# Patient Record
Sex: MALE | Race: WHITE | Employment: FULL TIME | URBAN - METROPOLITAN AREA
[De-identification: names, ages, dates, MRNs, and addresses within clinical notes are randomized per-mention and may not be internally consistent; named-entity substitution may affect disease eponyms.]

---

## 2017-09-13 PROBLEM — Z79.899 ON STATIN THERAPY: Status: ACTIVE | Noted: 2017-09-13

## 2017-09-13 PROBLEM — E66.9 OBESITY: Status: ACTIVE | Noted: 2017-09-13

## 2017-09-13 PROBLEM — J45.909 ASTHMA: Status: ACTIVE | Noted: 2017-09-13

## 2017-09-13 PROBLEM — L03.90 CELLULITIS: Status: ACTIVE | Noted: 2017-09-13

## 2017-09-13 PROBLEM — E78.5 HYPERLIPIDEMIA: Status: ACTIVE | Noted: 2017-09-13

## 2017-09-13 PROBLEM — G43.909 MIGRAINE: Status: ACTIVE | Noted: 2017-09-13

## 2017-09-13 PROBLEM — M19.90 DJD (DEGENERATIVE JOINT DISEASE): Status: ACTIVE | Noted: 2017-09-13

## 2017-09-13 PROBLEM — R73.02 GLUCOSE INTOLERANCE (IMPAIRED GLUCOSE TOLERANCE): Status: ACTIVE | Noted: 2017-09-13

## 2017-09-13 PROBLEM — K26.9 DUODENAL ULCER: Status: ACTIVE | Noted: 2017-09-13

## 2017-09-13 PROBLEM — Z00.00 ANNUAL PHYSICAL EXAM: Status: ACTIVE | Noted: 2017-09-13

## 2017-09-13 RX ORDER — SUMATRIPTAN 100 MG/1
100 TABLET, FILM COATED ORAL
COMMUNITY
End: 2018-01-02 | Stop reason: SDUPTHER

## 2017-09-21 DIAGNOSIS — I10 ESSENTIAL HYPERTENSION: Primary | ICD-10-CM

## 2017-09-21 RX ORDER — AMLODIPINE BESYLATE 5 MG/1
5 TABLET ORAL DAILY
Qty: 30 TAB | Refills: 11 | Status: SHIPPED | OUTPATIENT
Start: 2017-09-21 | End: 2018-10-09 | Stop reason: SDUPTHER

## 2017-09-21 NOTE — TELEPHONE ENCOUNTER
Requested Prescriptions     Pending Prescriptions Disp Refills    amLODIPine (NORVASC) 5 mg tablet 30 Tab 11     Sig: Take 1 Tab by mouth daily.  Indications: hypertension

## 2017-11-07 PROBLEM — I10 ESSENTIAL HYPERTENSION: Status: ACTIVE | Noted: 2017-11-07

## 2017-11-08 ENCOUNTER — OFFICE VISIT (OUTPATIENT)
Dept: INTERNAL MEDICINE CLINIC | Age: 67
End: 2017-11-08

## 2017-11-08 VITALS
TEMPERATURE: 98.3 F | BODY MASS INDEX: 35.5 KG/M2 | HEIGHT: 74 IN | WEIGHT: 276.6 LBS | DIASTOLIC BLOOD PRESSURE: 75 MMHG | HEART RATE: 61 BPM | SYSTOLIC BLOOD PRESSURE: 137 MMHG | OXYGEN SATURATION: 95 % | RESPIRATION RATE: 18 BRPM

## 2017-11-08 DIAGNOSIS — T75.89XA EFFECTS OF EXPOSURE TO EXTERNAL CAUSE, INITIAL ENCOUNTER: ICD-10-CM

## 2017-11-08 DIAGNOSIS — E78.2 MIXED HYPERLIPIDEMIA: ICD-10-CM

## 2017-11-08 DIAGNOSIS — J45.909 MILD ASTHMA WITHOUT COMPLICATION, UNSPECIFIED WHETHER PERSISTENT: ICD-10-CM

## 2017-11-08 DIAGNOSIS — I10 ESSENTIAL HYPERTENSION: Primary | ICD-10-CM

## 2017-11-08 DIAGNOSIS — E66.09 CLASS 2 OBESITY DUE TO EXCESS CALORIES WITHOUT SERIOUS COMORBIDITY WITH BODY MASS INDEX (BMI) OF 35.0 TO 35.9 IN ADULT: ICD-10-CM

## 2017-11-08 DIAGNOSIS — R73.02 GLUCOSE INTOLERANCE (IMPAIRED GLUCOSE TOLERANCE): ICD-10-CM

## 2017-11-08 DIAGNOSIS — M15.9 PRIMARY OSTEOARTHRITIS INVOLVING MULTIPLE JOINTS: ICD-10-CM

## 2017-11-08 DIAGNOSIS — Z00.00 MEDICARE ANNUAL WELLNESS VISIT, INITIAL: ICD-10-CM

## 2017-11-08 DIAGNOSIS — Z23 ENCOUNTER FOR IMMUNIZATION: ICD-10-CM

## 2017-11-08 LAB
ALBUMIN SERPL-MCNC: 4.6 G/DL (ref 3.9–5.4)
ALKALINE PHOS POC: 129 U/L (ref 38–126)
ALT SERPL-CCNC: 36 U/L (ref 9–52)
AST SERPL-CCNC: 26 U/L (ref 14–36)
BUN BLD-MCNC: 17 MG/DL (ref 9–20)
CALCIUM BLD-MCNC: 10.8 MG/DL (ref 8.4–10.2)
CHLORIDE BLD-SCNC: 105 MMOL/L (ref 98–107)
CHOLEST SERPL-MCNC: 186 MG/DL (ref 0–200)
CK (CPK) POC: 91 U/L (ref 30–135)
CO2 POC: 28 MMOL/L (ref 22–32)
CREAT BLD-MCNC: 0.9 MG/DL (ref 0.8–1.5)
EGFR (POC): 86.8
GLUCOSE POC: 98 MG/DL (ref 75–110)
HBA1C MFR BLD HPLC: 6.2 % (ref 4.5–5.7)
HDLC SERPL-MCNC: 56 MG/DL (ref 35–130)
LDL CHOLESTEROL POC: 103.4 MG/DL (ref 0–130)
POTASSIUM SERPL-SCNC: 5.6 MMOL/L (ref 3.6–5)
PROT SERPL-MCNC: 7.7 G/DL (ref 6.3–8.2)
SODIUM SERPL-SCNC: 145 MMOL/L (ref 137–145)
TCHOL/HDL RATIO (POC): 3.3 (ref 0–4)
TOTAL BILIRUBIN POC: 0.8 MG/DL (ref 0.2–1.3)
TRIGL SERPL-MCNC: 133 MG/DL (ref 0–200)
VLDLC SERPL CALC-MCNC: 26.6 MG/DL

## 2017-11-08 RX ORDER — ATORVASTATIN CALCIUM 40 MG/1
TABLET, FILM COATED ORAL DAILY
COMMUNITY
End: 2018-05-30 | Stop reason: SDUPTHER

## 2017-11-08 NOTE — PROGRESS NOTES
This is an Initial Medicare Annual Wellness Exam (AWV) (Performed 12 months after IPPE or effective date of Medicare Part B enrollment, Once in a lifetime)    I have reviewed the patient's medical history in detail and updated the computerized patient record. He presents today for his initial annual Medicare wellness examination screening questionnaire. Is also here in follow-up of his medical problems include hypertension, hyperlipidemia, prior glucose intolerance, DJD, asthma, history of migraine headaches, and obesity. He is trying to get some exercise but knows he do better there and he certainly knows he do better with diet. He is taking all his medications. He denies any chest pain shortness of breath or cardiorespiratory complaints. He denies any GI/ complaints. There are no headaches or neurologic planes. He has no arthritic complaints. There are no other complaints on complete review of systems. History     Past Medical History:   Diagnosis Date    Annual physical exam 9/13/2017    Asthma 9/13/2017    Cellulitis 9/13/2017    COPD     \"minor case of COPD\" per pt, uses Advair, followed by PCP    BEND (degenerative joint disease) 9/13/2017    Duodenal ulcer 9/13/2017    Glucose intolerance (impaired glucose tolerance) 9/13/2017    Hypercholesteremia     Hyperlipidemia 9/13/2017    Hypertension     Migraine 9/13/2017    Obesity 9/13/2017    On statin therapy 9/13/2017      Past Surgical History:   Procedure Laterality Date    HX SPLENECTOMY      age 13     Current Outpatient Prescriptions   Medication Sig Dispense Refill    mometasone-formoterol (DULERA) 200-5 mcg/actuation HFA inhaler Take 2 Puffs by inhalation two (2) times a day.  atorvastatin (LIPITOR) 40 mg tablet Take  by mouth daily.  amLODIPine (NORVASC) 5 mg tablet Take 1 Tab by mouth daily.  Indications: hypertension 30 Tab 11    SUMAtriptan (IMITREX) 100 mg tablet Take 100 mg by mouth once as needed for Migraine. No Known Allergies  History reviewed. No pertinent family history. Social History   Substance Use Topics    Smoking status: Never Smoker    Smokeless tobacco: Never Used    Alcohol use Not on file      Comment: \"very little\" Per pt     Patient Active Problem List   Diagnosis Code    Encounter for screening colonoscopy Z12.11    Asthma J45.909    DJD (degenerative joint disease) M19.90    Annual physical exam Z00.00    On statin therapy Z79.899    Glucose intolerance (impaired glucose tolerance) R73.02    Hyperlipidemia E78.5    Duodenal ulcer K26.9    Cellulitis L03.90    Obesity E66.9    Migraine G43.909    Essential hypertension I10    Medicare annual wellness visit, initial Z00.00       Depression Risk Factor Screening:     PHQ over the last two weeks 11/8/2017   Little interest or pleasure in doing things Not at all   Feeling down, depressed or hopeless Not at all   Total Score PHQ 2 0     Alcohol Risk Factor Screening: You do not drink alcohol or very rarely. Functional Ability and Level of Safety:     Hearing Loss  Hearing is good. Activities of Daily Living  The home contains: no safety equipment. Patient does total self care    Fall Risk  Fall Risk Assessment, last 12 mths 11/8/2017   Able to walk? Yes   Fall in past 12 months? No       Abuse Screen  Patient is not abused    Cognitive Screening   Evaluation of Cognitive Function:  Has your family/caregiver stated any concerns about your memory: no  Normal     ROS:    Constitutional: He denies fevers, weight loss, sweats. Eyes: No blurred or double vision. ENT: No difficulty with swallowing, taste, speech or smell. Neck: no stiffness or swelling  Respiratory: No cough wheezing or shortness of breath. Cardiovascular: Denies chest pain, palpitations, unexplained indigestion or syncope. Gastrointestinal:  No changes in bowel movements, no abdominal pain, no bloating.   Genitourinary:  He denies frequency, nocturia or stranguria. Extremities: No joint pain, stiffness or swelling. Neurological:  No numbness, tingling, burring paresthesias or loss of motor strength. No syncope, dizziness or frequent headache  Lymphatic: no adenopathy noted  Hematologic: no easy bruising or bleeding gums  Skin:  No recent rashes or mole changes. Psychiatric/Behavioral:  Negative for depression. Vitals:    11/08/17 1007   BP: 137/75   Pulse: 61   Resp: 18   Temp: 98.3 °F (36.8 °C)   TempSrc: Oral   SpO2: 95%   Weight: 276 lb 9.6 oz (125.5 kg)   Height: 6' 2\" (1.88 m)   PainSc:   0 - No pain        PHYSICAL EXAM:    General appearance - alert, well appearing, and in no distress  Mental status - alert, oriented to person, place, and time  HEENT:  Ears - bilateral TM's and external ear canals clear  Eyes - pupillary responses were normal.  Extraocular muscle function intact. Lids and conjunctiva not injected. Fundoscopic exam revealed sharp disc margins. eye movements intact  Pharynx- clear with teeth in good repair. No masses were noted  Neck - supple without thyromegaly or burit. No JVD noted  Lungs - clear to auscultation and percussion  Cardiac- normal rate, regular rhythm without murmurs. PMI not displaced. No gallop, rub or click  Abdomen - flat, soft, non-tender without palpable organomegaly or mass. No pulsatile mass was felt, and not bruit was heard. Bowel sounds were active  : Circumcised, Testes descended w/o masses  Rectal: Deferred as done earlier this year. Extremities -  no clubbing cyanosis or edema  Lymphatics - no palpable lymphadenopathy, no hepatosplenomegaly  Hematologic: no petechiae or purpura  Peripheral vascular -Femoral, Dorsalis pedis and posterior tibial pulses felt without difficulty  Skin - no rash or unusual mole change noted  Neurological - Cranial nerves II-XII grossly intact. Motor strength 5/5. DTR's 2+ and symmetric.   Station and gait normal  Back exam - full range of motion, no tenderness, palpable spasm or pain on motion  Musculoskeletal - no joint tenderness, deformity or swelling      Patient Care Team   Patient Care Team:  Princess John MD as PCP - General (Internal Medicine)    Assessment/Plan   Education and counseling provided:  Are appropriate based on today's review and evaluation    ASSESSMENT:   1. Essential hypertension    2. Glucose intolerance (impaired glucose tolerance)    3. Mixed hyperlipidemia    4. Mild asthma without complication, unspecified whether persistent    5. Primary osteoarthritis involving multiple joints    6. Class 2 obesity due to excess calories without serious comorbidity with body mass index (BMI) of 35.0 to 35.9 in adult    7. Encounter for immunization    8. Medicare annual wellness visit, initial    9. Effects of exposure to external cause, initial encounter      Impression  1. Hypertension that is well controlled continue current therapy reviewed with him  2. Prior glucose intolerance his last A1c was 5.5 and he has not had a problem with A1c for some time. He will continue with diet  3. Hyperlipidemia repeat status pending we will see what the status is today and adjust medications if necessary  4. Asthma currently has not had any flares recently I gave him samples of Dulera  5. DJD seems to be stable  6. Obesity that is his major problem we discussed diet exercise weight reduction absolute need to get that down for his long-term health  Medicare annual wellness examination screening questionnaire completed today. The results were reviewed with him his questions were answered. Lifestyle recommendations and modifications discussed and made. Pneumococcal 23 vaccine given today. He is already had the flu vaccine for this year. Labs are pending as noted will make further recommendations based on those. Follow stable we will continue same and I will recheck him again in 6 months or sooner should be a problem.     PLAN:  .  Orders Placed This Encounter    Pneumococcal Polysaccharide vaccine, 23-Valent, Adult or Immunocompromised    HEPATITIS C AB    AMB POC LIPID PROFILE    AMB POC HEMOGLOBIN A1C    AMB POC COMPREHENSIVE METABOLIC PANEL    AMB POC CK (CPK)    mometasone-formoterol (DULERA) 200-5 mcg/actuation HFA inhaler    atorvastatin (LIPITOR) 40 mg tablet         ATTENTION:   This medical record was transcribed using an electronic medical records system. Although proofread, it may and can contain electronic and spelling errors. Other human spelling and other errors may be present. Corrections may be executed at a later time. Please feel free to contact us for any clarifications as needed. Follow-up Disposition:  Return in about 6 months (around 5/8/2018).       Orin Carmona MD     Health Maintenance Due   Topic Date Due    DTaP/Tdap/Td series (1 - Tdap) 07/06/1969    FOBT Q 1 YEAR AGE 50-75  07/06/1998    ZOSTER VACCINE AGE 60>  05/06/2008    GLAUCOMA SCREENING Q2Y  07/06/2013

## 2017-11-08 NOTE — MR AVS SNAPSHOT
Visit Information Date & Time Provider Department Dept. Phone Encounter #  
 11/8/2017  9:40 AM Laurie Urban MD [facility] [id number] Follow-up Instructions Return in about 6 months (around 5/8/2018). Follow-up and Disposition History Upcoming Health Maintenance Date Due DTaP/Tdap/Td series (1 - Tdap) 7/6/1969 FOBT Q 1 YEAR AGE 50-75 7/6/1998 ZOSTER VACCINE AGE 60> 5/6/2008 GLAUCOMA SCREENING Q2Y 7/6/2013 MEDICARE YEARLY EXAM 11/9/2018 Allergies as of 11/8/2017  Review Complete On: 11/8/2017 By: Laurie Urban MD  
 No Known Allergies Current Immunizations  Never Reviewed Name Date Influenza Vaccine 10/17/2017, 10/20/2016, 10/9/2015, 11/7/2014 Pneumococcal Conjugate (PCV-13) 10/7/2015 Pneumococcal Polysaccharide (PPSV-23) 11/8/2017 Pneumococcal Vaccine (Unspecified Type) 7/31/2009 Not reviewed this visit You Were Diagnosed With   
  
 Codes Comments Essential hypertension    -  Primary ICD-10-CM: I10 
ICD-9-CM: 401.9 Glucose intolerance (impaired glucose tolerance)     ICD-10-CM: R73.02 
ICD-9-CM: 790.22 Mixed hyperlipidemia     ICD-10-CM: E78.2 ICD-9-CM: 272.2 Mild asthma without complication, unspecified whether persistent     ICD-10-CM: J45.909 ICD-9-CM: 493.90 Primary osteoarthritis involving multiple joints     ICD-10-CM: M15.0 ICD-9-CM: 715.09 Class 2 obesity due to excess calories without serious comorbidity with body mass index (BMI) of 35.0 to 35.9 in adult     ICD-10-CM: E66.09, Z68.35 ICD-9-CM: 278.00, V85.35 Encounter for immunization     ICD-10-CM: G58 ICD-9-CM: V03.89 Medicare annual wellness visit, initial     ICD-10-CM: Z00.00 ICD-9-CM: V70.0 Effects of exposure to external cause, initial encounter     ICD-10-CM: T75.89XA ICD-9-CM: 994.9 Vitals BP Pulse Temp Resp Height(growth percentile) Weight(growth percentile) 137/75 61 98.3 °F (36.8 °C) (Oral) 18 6' 2\" (1.88 m) 276 lb 9.6 oz (125.5 kg) SpO2 BMI Smoking Status 95% 35.51 kg/m2 Never Smoker BMI and BSA Data Body Mass Index Body Surface Area 35.51 kg/m 2 2.56 m 2 Your Updated Medication List  
  
   
This list is accurate as of: 11/8/17 11:08 AM.  Always use your most recent med list. amLODIPine 5 mg tablet Commonly known as:  Apple Citron Take 1 Tab by mouth daily. Indications: hypertension  
  
 atorvastatin 40 mg tablet Commonly known as:  LIPITOR Take  by mouth daily. DULERA 200-5 mcg/actuation HFA inhaler Generic drug:  mometasone-formoterol Take 2 Puffs by inhalation two (2) times a day. SUMAtriptan 100 mg tablet Commonly known as:  IMITREX Take 100 mg by mouth once as needed for Migraine. We Performed the Following ADMIN PNEUMOCOCCAL VACCINE [ [facility]] AMB POC CK (CPK) [57384 CPT(R)] AMB POC COMPREHENSIVE METABOLIC PANEL [10172 CPT(R)] AMB POC HEMOGLOBIN A1C [34733 CPT(R)] AMB POC LIPID PROFILE [64089 CPT(R)] HEPATITIS C AB [90712 CPT(R)] PNEUMOCOCCAL POLYSACCHARIDE VACCINE, 23-VALENT, ADULT OR IMMUNOSUPPRESSED PT DOSE, [40238 CPT(R)] Follow-up Instructions Return in about 6 months (around 5/8/2018). Introducing [facility]! Albina Beltrán introduces [employer] patient portal. Now you can access parts of your medical record, email your doctor's office, and request medication refills online. 1. In your internet browser, go to [email or web address] 2. Click on the First Time User? Click Here link in the Sign In box. You will see the New Member Sign Up page. 3. Enter your [employer] Access Code exactly as it appears below. You will not need to use this code after youve completed the sign-up process. If you do not sign up before the expiration date, you must request a new code. · Wanelo Access Code: [email or web address] Expires: 2/6/2018  9:37 AM 
 
4. Enter the last four digits of your Social Security Number (xxxx) and Date of Birth (mm/dd/yyyy) as indicated and click Submit. You will be taken to the next sign-up page. 5. Create a Wanelo ID. This will be your Wanelo login ID and cannot be changed, so think of one that is secure and easy to remember. 6. Create a Wanelo password. You can change your password at any time. 7. Enter your Password Reset Question and Answer. This can be used at a later time if you forget your password. 8. Enter your e-mail address. You will receive e-mail notification when new information is available in [address] 9. Click Sign Up. You can now view and download portions of your medical record. 10. Click the Download Summary menu link to download a portable copy of your medical information. If you have questions, please visit the Frequently Asked Questions section of the Wanelo website. Remember, Wanelo is NOT to be used for urgent needs. For medical emergencies, dial 911. Now available from your iPhone and Android! Please provide this summary of care documentation to your next provider. Your primary care clinician is listed as Aftab. If you have any questions after today's visit, please call [phone].

## 2017-11-08 NOTE — PROGRESS NOTES
Chief Complaint   Patient presents with    Hypertension     1. Have you been to the ER, urgent care clinic since your last visit? Hospitalized since your last visit? NO    2. Have you seen or consulted any other health care providers outside of the [address] [place] [address] since your last visit? Include any pap smears or colon screening.  NO

## 2017-11-09 LAB — HCV AB S/CO SERPL IA: <0.1 S/CO RATIO (ref 0–0.9)

## 2018-01-02 DIAGNOSIS — G43.719 INTRACTABLE CHRONIC MIGRAINE WITHOUT AURA AND WITHOUT STATUS MIGRAINOSUS: Primary | ICD-10-CM

## 2018-01-02 RX ORDER — SUMATRIPTAN 100 MG/1
TABLET, FILM COATED ORAL
Qty: 9 TAB | Refills: 4 | Status: SHIPPED | OUTPATIENT
Start: 2018-01-02 | End: 2018-05-30 | Stop reason: SDUPTHER

## 2018-01-02 NOTE — TELEPHONE ENCOUNTER
Requested Prescriptions     Pending Prescriptions Disp Refills    SUMAtriptan (IMITREX) 100 mg tablet [Pharmacy Med Name: SUMATRIPTAN SUCC 100 MG TABLET] 9 Tab 4     Sig: TAKE ONE TABLET BY MOUTH DAILY AS DIRECTED PER MD

## 2018-05-07 PROBLEM — M15.9 PRIMARY OSTEOARTHRITIS INVOLVING MULTIPLE JOINTS: Status: ACTIVE | Noted: 2018-05-07

## 2018-05-07 PROBLEM — E66.09 CLASS 2 OBESITY DUE TO EXCESS CALORIES WITHOUT SERIOUS COMORBIDITY WITH BODY MASS INDEX (BMI) OF 35.0 TO 35.9 IN ADULT: Status: ACTIVE | Noted: 2017-09-13

## 2018-05-07 PROBLEM — E78.2 MIXED HYPERLIPIDEMIA: Status: ACTIVE | Noted: 2017-09-13

## 2018-05-07 PROBLEM — J45.20 MILD INTERMITTENT ASTHMA WITHOUT COMPLICATION: Status: ACTIVE | Noted: 2018-05-07

## 2018-05-08 ENCOUNTER — OFFICE VISIT (OUTPATIENT)
Dept: INTERNAL MEDICINE CLINIC | Age: 68
End: 2018-05-08

## 2018-05-08 VITALS
WEIGHT: 284 LBS | DIASTOLIC BLOOD PRESSURE: 84 MMHG | HEART RATE: 60 BPM | OXYGEN SATURATION: 97 % | TEMPERATURE: 97.9 F | BODY MASS INDEX: 36.45 KG/M2 | HEIGHT: 74 IN | RESPIRATION RATE: 16 BRPM | SYSTOLIC BLOOD PRESSURE: 110 MMHG

## 2018-05-08 DIAGNOSIS — M15.9 PRIMARY OSTEOARTHRITIS INVOLVING MULTIPLE JOINTS: ICD-10-CM

## 2018-05-08 DIAGNOSIS — I10 ESSENTIAL HYPERTENSION: Primary | ICD-10-CM

## 2018-05-08 DIAGNOSIS — E78.2 MIXED HYPERLIPIDEMIA: ICD-10-CM

## 2018-05-08 DIAGNOSIS — J45.20 MILD INTERMITTENT ASTHMA WITHOUT COMPLICATION: ICD-10-CM

## 2018-05-08 DIAGNOSIS — E66.01 SEVERE OBESITY (BMI 35.0-39.9) WITH COMORBIDITY (HCC): ICD-10-CM

## 2018-05-08 DIAGNOSIS — R73.02 GLUCOSE INTOLERANCE (IMPAIRED GLUCOSE TOLERANCE): ICD-10-CM

## 2018-05-08 DIAGNOSIS — E66.09 CLASS 2 OBESITY DUE TO EXCESS CALORIES WITHOUT SERIOUS COMORBIDITY WITH BODY MASS INDEX (BMI) OF 35.0 TO 35.9 IN ADULT: ICD-10-CM

## 2018-05-08 LAB
ALBUMIN SERPL-MCNC: 4.5 G/DL (ref 3.9–5.4)
ALKALINE PHOS POC: 98 U/L (ref 38–126)
ALT SERPL-CCNC: 31 U/L (ref 9–52)
AST SERPL-CCNC: 25 U/L (ref 14–36)
BUN BLD-MCNC: 20 MG/DL (ref 9–20)
CALCIUM BLD-MCNC: 10.4 MG/DL (ref 8.4–10.2)
CHLORIDE BLD-SCNC: 106 MMOL/L (ref 98–107)
CHOLEST SERPL-MCNC: 175 MG/DL (ref 0–200)
CK (CPK) POC: 115 U/L (ref 30–135)
CO2 POC: 28 MMOL/L (ref 22–32)
CREAT BLD-MCNC: 0.9 MG/DL (ref 0.8–1.5)
EGFR (POC): 86.8
GLUCOSE POC: 109 MG/DL (ref 75–110)
HBA1C MFR BLD HPLC: 5.9 % (ref 4.5–5.7)
HDLC SERPL-MCNC: 60 MG/DL (ref 35–130)
LDL CHOLESTEROL POC: 96.8 MG/DL (ref 0–130)
POTASSIUM SERPL-SCNC: 5.4 MMOL/L (ref 3.6–5)
PROT SERPL-MCNC: 7.5 G/DL (ref 6.3–8.2)
SODIUM SERPL-SCNC: 146 MMOL/L (ref 137–145)
TCHOL/HDL RATIO (POC): 2.9 (ref 0–4)
TOTAL BILIRUBIN POC: 1.1 MG/DL (ref 0.2–1.3)
TRIGL SERPL-MCNC: 91 MG/DL (ref 0–200)
VLDLC SERPL CALC-MCNC: 18.2 MG/DL

## 2018-05-08 NOTE — ACP (ADVANCE CARE PLANNING)
Discussed with patient advanced care medical directives. Does not have any. Information given for patient to review.

## 2018-05-08 NOTE — PROGRESS NOTES
Chief Complaint   Patient presents with    Hypertension     6 month follow up     Obesity     1. Have you been to the ER, urgent care clinic since your last visit? Hospitalized since your last visit? No    2. Have you seen or consulted any other health care providers outside of the [address] [place] [address] since your last visit? Include any pap smears or colon screening.  No     Fasting

## 2018-05-08 NOTE — MR AVS SNAPSHOT
[facility] 
 
 
 [address] [address] [phone] Patient: Peter Combs MRN: [id number] WNF:[id number] Visit Information Date & Time Provider Department Dept. Phone Encounter #  
 5/8/2018  8:40 AM Pina Grimes MD [phone] [id number] Your Appointments 11/8/2018  8:00 AM  
FOLLOW UP 10 with MD FABRICE Leroy [facility] ([facility]) Appt Note: [address] [phone] [address] [address] Upcoming Health Maintenance Date Due DTaP/Tdap/Td series (1 - Tdap) 7/6/1969 FOBT Q 1 YEAR AGE 50-75 7/6/1998 ZOSTER VACCINE AGE 60> 5/6/2008 GLAUCOMA SCREENING Q2Y 7/6/2013 Influenza Age 5 to Adult 8/1/2018 MEDICARE YEARLY EXAM 11/9/2018 Allergies as of 5/8/2018  Review Complete On: 5/8/2018 By: Valentina Jensen Certified Medical Assistant No Known Allergies Current Immunizations  Never Reviewed Name Date Influenza Vaccine 10/17/2017, 10/20/2016, 10/9/2015, 11/7/2014 Pneumococcal Conjugate (PCV-13) 10/7/2015 Pneumococcal Polysaccharide (PPSV-23) 11/8/2017 Pneumococcal Vaccine (Unspecified Type) 7/31/2009 Not reviewed this visit Vitals BP Pulse Temp Resp Height(growth percentile) Weight(growth percentile) 110/84 (BP 1 Location: Left arm, BP Patient Position: Sitting) 60 97.9 °F (36.6 °C) (Oral) 16 6' 2\" (1.88 m) 284 lb (128.8 kg) SpO2 BMI Smoking Status 97% 36.46 kg/m2 Never Smoker Vitals History BMI and BSA Data Body Mass Index Body Surface Area  
 36.46 kg/m 2 2.59 m 2 Preferred Pharmacy Pharmacy Name Phone [facility] [address] [place] [address] Dr Muniz, [address] [facility] [phone] Your Updated Medication List  
  
   
 This list is accurate as of 5/8/18  9:17 AM.  Always use your most recent med list. amLODIPine 5 mg tablet Commonly known as:  Rio Rings Take 1 Tab by mouth daily. Indications: hypertension  
  
 atorvastatin 40 mg tablet Commonly known as:  LIPITOR Take  by mouth daily. DULERA 200-5 mcg/actuation HFA inhaler Generic drug:  mometasone-formoterol Take 2 Puffs by inhalation two (2) times a day. SUMAtriptan 100 mg tablet Commonly known as:  IMITREX  
TAKE ONE TABLET BY MOUTH DAILY AS DIRECTED PER MD  
  
  
  
  
Introducing [employer]! Felicia Moncada introduces [employer] patient portal. Now you can access parts of your medical record, email your doctor's office, and request medication refills online. 1. In your internet browser, go to [email or web address] 2. Click on the First Time User? Click Here link in the Sign In box. You will see the New Member Sign Up page. 3. Enter your Y[employer]o Access Code exactly as it appears below. You will not need to use this code after youve completed the sign-up process. If you do not sign up before the expiration date, you must request a new code. · [employer] Access Code: [email or web address] Expires: 8/6/2018  9:17 AM 
 
4. Enter the last four digits of your Social Security Number (xxxx) and Date of Birth (mm/dd/yyyy) as indicated and click Submit. You will be taken to the next sign-up page. 5. Create a Yasmo ID. This will be your Yasmo login ID and cannot be changed, so think of one that is secure and easy to remember. 6. Create a Yasmo password. You can change your password at any time. 7. Enter your Password Reset Question and Answer. This can be used at a later time if you forget your password. 8. Enter your e-mail address. You will receive e-mail notification when new information is available in [address] 9. Click Sign Up. You can now view and download portions of your medical record. 10. Click the Download Summary menu link to download a portable copy of your medical information. If you have questions, please visit the Frequently Asked Questions section of the [employer] website. Remember, [employer] is NOT to be used for urgent needs. For medical emergencies, dial 911. Now available from your iPhone and Android! Please provide this summary of care documentation to your next provider. Your primary care clinician is listed as Aftab. If you have any questions after today's visit, please call [phone].

## 2018-05-08 NOTE — PROGRESS NOTES
Chief Complaint   Patient presents with    Hypertension     6 month follow up     Obesity       SUBJECTIVE:    Luz Elena Morales is a 69 y.o. male who returns in follow-up of his medical problems include hypertension, glucose intolerance, hyperlipidemia, asthma, DJD, and obesity. He is taking his medications and trying to follow his diet and get some exercise. He did not have any problems with any asthma flare so far through the allergy season. He is still using his inhaler Dulera. He denies any chest pain, shortness breath, palpitations or cardiorespiratory complaints. He denies any GI or  complaints. He denies any headaches, dizziness or neurologic complaints. He has no current arthritic complaints and then no other complaints on complete review of systems. Current Outpatient Prescriptions   Medication Sig Dispense Refill    SUMAtriptan (IMITREX) 100 mg tablet TAKE ONE TABLET BY MOUTH DAILY AS DIRECTED PER MD 9 Tab 4    mometasone-formoterol (DULERA) 200-5 mcg/actuation HFA inhaler Take 2 Puffs by inhalation two (2) times a day.  atorvastatin (LIPITOR) 40 mg tablet Take  by mouth daily.  amLODIPine (NORVASC) 5 mg tablet Take 1 Tab by mouth daily.  Indications: hypertension 27 Tab 11     Past Medical History:   Diagnosis Date    Annual physical exam 9/13/2017    Asthma 9/13/2017    Cellulitis 9/13/2017    COPD     \"minor case of COPD\" per pt, uses Advair, followed by PCP    ALO (degenerative joint disease) 9/13/2017    Duodenal ulcer 9/13/2017    Glucose intolerance (impaired glucose tolerance) 9/13/2017    Hypercholesteremia     Hyperlipidemia 9/13/2017    Hypertension     Migraine 9/13/2017    Obesity 9/13/2017    On statin therapy 9/13/2017     Past Surgical History:   Procedure Laterality Date    HX SPLENECTOMY      age 13     No Known Allergies    REVIEW OF SYSTEMS:  General: negative for - chills or fever, or weight loss or gain  ENT: negative for - headaches, nasal congestion or tinnitus  Eyes: no blurred or visual changes  Neck: No stiffness or swollen nodes  Respiratory: negative for - cough, hemoptysis, shortness of breath or wheezing  Cardiovascular : negative for - chest pain, edema, palpitations or shortness of breath  Gastrointestinal: negative for - abdominal pain, blood in stools, heartburn or nausea/vomiting  Genito-Urinary: no dysuria, trouble voiding, or hematuria  Musculoskeletal: negative for - gait disturbance, joint pain, joint stiffness or joint swelling  Neurological: no TIA or stroke symptoms  Hematologic: no bruises, no bleeding  Lymphatic: no swollen glands  Integument: no lumps, mole changes, nail changes or rash  Endocrine:no malaise/lethargy poly uria or polydipsia or unexpected weight changes        Social History     Social History    Marital status:      Spouse name: N/A    Number of children: N/A    Years of education: N/A     Social History Main Topics    Smoking status: Never Smoker    Smokeless tobacco: Never Used    Alcohol use None      Comment: \"very little\" Per pt    Drug use: No    Sexual activity: Not Asked     Other Topics Concern    None     Social History Narrative     History reviewed. No pertinent family history. OBJECTIVE:     Visit Vitals    /84 (BP 1 Location: Left arm, BP Patient Position: Sitting)    Pulse 60    Temp 97.9 °F (36.6 °C) (Oral)    Resp 16    Ht 6' 2\" (1.88 m)    Wt 284 lb (128.8 kg)    SpO2 97%    BMI 36.46 kg/m2     CONSTITUTIONAL:   well nourished, appears age appropriate  EYES: sclera anicteric, PERRL, EOMI  ENMT:nares clear, moist mucous membranes, pharynx clear  NECK: supple.  Thyroid normal, No JVD or bruits  RESPIRATORY: Chest: clear to ascultation and percussion, normal inspiratory effort  CARDIOVASCULAR: Heart: regular rate and rhythm no murmurs, rubs or gallops, PMI not displaced, No thrills  GASTROINTESTINAL: Abdomen: non distended, soft, non tender, bowel sounds normal  HEMATOLOGIC: no purpura, petechiae or bruising  LYMPHATIC: No lymph node enlargemant  MUSCULOSKELETAL: Extremities: no edema or active synovitis, pulse 1+   INTEGUMENT: No unusual rashes or suspicious skin lesions noted. Nails appear normal.  PERIPHERAL VASCULAR: normal pulses femoral, PT and DP  NEUROLOGIC: non-focal exam, A & O X 3  PSYCHIATRIC:, appropriate affect     ASSESSMENT:   1. Essential hypertension    2. Glucose intolerance (impaired glucose tolerance)    3. Mixed hyperlipidemia    4. Primary osteoarthritis involving multiple joints    5. Mild intermittent asthma without complication    6. Class 2 obesity due to excess calories without serious comorbidity with body mass index (BMI) of 35.0 to 35.9 in adult    7. Severe obesity (BMI 35.0-39. 9) with comorbidity ([address]      Impression  1. Hypertension that is controlled continue current therapy reviewed with him  2.  Glucose intolerance repeat status pending reviewed prior labs and make adjustments if necessary  3. Hyperlipidemia prior labs reviewed repeat status pending will adjust if needed  4. DJD that is stable  5. Asthma currently stable  6. Obesity again discussed absolute importance of work on getting his weight down which is unfortunately up 8 pounds since his last visit. Discussed overall health benefit of that and ways to get his weight down with diet and exercise. I will call the lab make further recommendations adjustments if necessary. Follow-up scheduled again for 6 months. Advance care planning discussed with him and he does not have a medical directive on file and I suggested he work on getting one. Samples of Dulera given. PLAN:  .  Orders Placed This Encounter    AMB POC HEMOGLOBIN A1C    AMB POC LIPID PROFILE    AMB POC COMPREHENSIVE METABOLIC PANEL    AMB POC CK (CPK)         ATTENTION:   This medical record was transcribed using an electronic medical records system.   Although proofread, it may and can contain electronic and spelling errors. Other human spelling and other errors may be present. Corrections may be executed at a later time. Please feel free to contact us for any clarifications as needed. Follow-up Disposition:  Return in about 6 months (around 11/8/2018). No results found for any visits on 05/08/18. Pina Grimes MD    The patient verbalized understanding of the problems and plans as explained.

## 2018-05-30 DIAGNOSIS — G43.719 INTRACTABLE CHRONIC MIGRAINE WITHOUT AURA AND WITHOUT STATUS MIGRAINOSUS: ICD-10-CM

## 2018-05-30 DIAGNOSIS — E78.2 MIXED HYPERLIPIDEMIA: Primary | ICD-10-CM

## 2018-05-31 RX ORDER — SUMATRIPTAN 100 MG/1
TABLET, FILM COATED ORAL
Qty: 9 TAB | Refills: 3 | Status: SHIPPED | OUTPATIENT
Start: 2018-05-31 | End: 2018-10-23 | Stop reason: SDUPTHER

## 2018-05-31 RX ORDER — ATORVASTATIN CALCIUM 40 MG/1
TABLET, FILM COATED ORAL
Qty: 30 TAB | Refills: 11 | Status: SHIPPED | OUTPATIENT
Start: 2018-05-31 | End: 2019-04-14 | Stop reason: SDUPTHER

## 2018-05-31 NOTE — TELEPHONE ENCOUNTER
RX refill request from the patient/pharmacy. Patient last seen 05- with labs, and next appt. scheduled for 11-.

## 2018-10-09 DIAGNOSIS — I10 ESSENTIAL HYPERTENSION: ICD-10-CM

## 2018-10-09 RX ORDER — AMLODIPINE BESYLATE 5 MG/1
TABLET ORAL
Qty: 30 TAB | Refills: 11 | Status: SHIPPED | OUTPATIENT
Start: 2018-10-09 | End: 2019-10-16 | Stop reason: SDUPTHER

## 2018-10-09 NOTE — TELEPHONE ENCOUNTER
RX refill request from the patient/pharmacy.  Patient last seen 05- with labs, and next appt. scheduled for 11-    Requested Prescriptions     Pending Prescriptions Disp Refills    amLODIPine (NORVASC) 5 mg tablet [Pharmacy Med Name: amLODIPine BESYLATE 5 MG TAB] 30 Tab 11     Sig: TAKE ONE TABLET BY MOUTH DAILY

## 2018-10-23 DIAGNOSIS — G43.719 INTRACTABLE CHRONIC MIGRAINE WITHOUT AURA AND WITHOUT STATUS MIGRAINOSUS: ICD-10-CM

## 2018-10-23 RX ORDER — SUMATRIPTAN 100 MG/1
TABLET, FILM COATED ORAL
Qty: 9 TAB | Refills: 2 | Status: SHIPPED | OUTPATIENT
Start: 2018-10-23 | End: 2019-01-15 | Stop reason: SDUPTHER

## 2018-10-23 NOTE — TELEPHONE ENCOUNTER
RX refill request from the patient/pharmacy.  Patient last seen 05- with labs, and next appt. scheduled for 11-  Requested Prescriptions     Pending Prescriptions Disp Refills    SUMAtriptan (IMITREX) 100 mg tablet [Pharmacy Med Name: SUMAtriptan SUCC 100 MG TABLET] 9 Tab 2     Sig: TAKE ONE TABLET BY MOUTH DAILY AS DIRECTED PER MD   .

## 2018-11-07 PROBLEM — Z12.5 PROSTATE CANCER SCREENING: Status: ACTIVE | Noted: 2018-11-07

## 2018-11-07 NOTE — PROGRESS NOTES
This is a Subsequent Medicare Annual Wellness Visit providing Personalized Prevention Plan Services (PPPS) (Performed 12 months after initial AWV and PPPS ) I have reviewed the patient's medical history in detail and updated the computerized patient record. He presents today for subsequent annual wellness examination screening questionnaire. He is also here in follow-up of his multiple medical problems to include hypertension, glucose intolerance, hyperlipidemia, asthma, DJD, obesity, irritable bowel and other medical problems. He is taking his medications and trying to follow his diet and get some exercise but he knows he could do what better with diet and exercise. He is still working on a daily basis but plans to retire soon. He currently denies any chest pain, shortness breath, palpitations, PND, orthopnea or cardiorespiratory complaints. There are no GI/ complaints. He denies any headaches, dizziness or neurologic planes. He has no current arthritic complaints and then no other complaints on complete review of systems. History Past Medical History:  
Diagnosis Date  Annual physical exam 9/13/2017  Asthma 9/13/2017  Cellulitis 9/13/2017  COPD \"minor case of COPD\" per pt, uses Advair, followed by PCP  ALO (degenerative joint disease) 9/13/2017  Duodenal ulcer 9/13/2017  Glucose intolerance (impaired glucose tolerance) 9/13/2017  Hypercholesteremia  Hyperlipidemia 9/13/2017  Hypertension  Migraine 9/13/2017  Obesity 9/13/2017  On statin therapy 9/13/2017 Past Surgical History:  
Procedure Laterality Date  HX SPLENECTOMY    
 age 13 Social History Tobacco Use  Smoking status: Never Smoker  Smokeless tobacco: Never Used Substance Use Topics  Alcohol use: Not on file Comment: \"very little\" Per pt  Drug use: No  
 
Current Outpatient Medications Medication Sig Dispense Refill  SUMAtriptan (IMITREX) 100 mg tablet TAKE ONE TABLET BY MOUTH DAILY AS DIRECTED PER MD 9 Tab 2  
 amLODIPine (NORVASC) 5 mg tablet TAKE ONE TABLET BY MOUTH DAILY 30 Tab 11  
 atorvastatin (LIPITOR) 40 mg tablet TAKE ONE TABLET BY MOUTH DAILY 30 Tab 11  
 mometasone-formoterol (DULERA) 200-5 mcg/actuation HFA inhaler Take 2 Puffs by inhalation two (2) times a day. No Known Allergies History reviewed. No pertinent family history. Patient Active Problem List  
 Diagnosis  Primary osteoarthritis involving multiple joints  Mild intermittent asthma without complication  Essential hypertension  Glucose intolerance (impaired glucose tolerance)  Mixed hyperlipidemia  Severe obesity (BMI 35.0-39. 9) with comorbidity ([address]  Prostate cancer screening  Medicare annual wellness visit, subsequent  Annual physical exam  
 Duodenal ulcer  Cellulitis  Migraine  Encounter for screening colonoscopy Patient Care Team: 
Marita Ng MD as PCP - General (Internal Medicine) Depression Risk Factor Screening: PHQ over the last two weeks 11/8/2018 Little interest or pleasure in doing things Not at all Feeling down, depressed, irritable, or hopeless Not at all Total Score PHQ 2 0 Alcohol Risk Factor Screening: You do not drink alcohol or very rarely. Functional Ability and Level of Safety:  
 
Fall Risk Fall Risk Assessment, last 12 mths 11/8/2018 Able to walk? Yes Fall in past 12 months? No  
 
 
Hearing Loss  
mild Activities of Daily Living Self-care. ADL Assessment 5/8/2018 Feeding yourself No Help Needed Getting from bed to chair No Help Needed Getting dressed No Help Needed Bathing or showering No Help Needed Walk across the room (includes cane/walker) No Help Needed Using the telphone No Help Needed Taking your medications No Help Needed Preparing meals No Help Needed Managing money (expenses/bills) No Help Needed Moderately strenuous housework (laundry) No Help Needed Shopping for personal items (toiletries/medicines) No Help Needed Shopping for groceries No Help Needed Driving No Help Needed Climbing a flight of stairs No Help Needed Getting to places beyond walking distances No Help Needed Abuse Screen Patient is not abused Social History Social History Narrative  Not on file Review of Systems ROS: 
 
Constitutional: He denies fevers, weight loss, sweats. Eyes: No blurred or double vision. ENT: No difficulty with swallowing, taste, speech or smell. Neck: no stiffness or swelling Respiratory: No cough wheezing or shortness of breath. Cardiovascular: Denies chest pain, palpitations, unexplained indigestion or syncope. Gastrointestinal:  No changes in bowel movements, no abdominal pain, no bloating. Genitourinary:  He denies frequency, nocturia or stranguria. Extremities: No joint pain, stiffness or swelling. Neurological:  No numbness, tingling, burring paresthesias or loss of motor strength. No syncope, dizziness or frequent headache Lymphatic: no adenopathy noted Hematologic: no easy bruising or bleeding gums Skin:  No recent rashes or mole changes. Psychiatric/Behavioral:  Negative for depression. Physical Examination Evaluation of Cognitive Function: 
Mood/affect:  happy Appearance: age appropriate Family member/caregiver input: none Visit Vitals /80 (BP 1 Location: Left arm, BP Patient Position: Sitting) Pulse 68 Resp 18 Ht 6' 2\" (1.88 m) Wt 282 lb 9.6 oz (128.2 kg) SpO2 95% BMI 36.28 kg/m² Vitals:  
 11/08/18 3342 BP: 122/80 Pulse: 68 Resp: 18 SpO2: 95% Weight: 282 lb 9.6 oz (128.2 kg) Height: 6' 2\" (1.88 m) PainSc:   0 - No pain PHYSICAL EXAM: 
 
General appearance - alert, well appearing, and in no distress Mental status - alert, oriented to person, place, and time HEENT: 
 Ears - bilateral TM's and external ear canals clear Eyes - pupillary responses were normal.  Extraocular muscle function intact. Lids and conjunctiva not injected. Fundoscopic exam revealed sharp disc margins. eye movements intact Pharynx- clear with teeth in good repair. No masses were noted Neck - supple without thyromegaly or burit. No JVD noted Lungs - clear to auscultation and percussion Cardiac- normal rate, regular rhythm without murmurs. PMI not displaced. No gallop, rub or click Abdomen - flat, soft, non-tender without palpable organomegaly or mass. No pulsatile mass was felt, and not bruit was heard. Bowel sounds were active : Circumcised, Testes descended w/o masses Rectal: normal sphincter tone, prostate normal, no masses, stool brown and hemacult negative Extremities -  no clubbing cyanosis or edema Lymphatics - no palpable lymphadenopathy, no hepatosplenomegaly Hematologic: no petechiae or purpura Peripheral vascular -Femoral, Dorsalis pedis and posterior tibial pulses felt without difficulty Skin - no rash or unusual mole change noted Neurological - Cranial nerves II-XII grossly intact. Motor strength 5/5. DTR's 2+ and symmetric. Station and gait normal 
Back exam - full range of motion, no tenderness, palpable spasm or pain on motion Musculoskeletal - no joint tenderness, deformity or swelling Results for orders placed or performed in visit on 05/08/18 AMB POC HEMOGLOBIN A1C Result Value Ref Range Hemoglobin A1c (POC) 5.9 (A) 4.5 - 5.7 % AMB POC LIPID PROFILE Result Value Ref Range Cholesterol (POC) 175.0 0 - 200 mg/dL Triglycerides (POC) 91.0 0 - 200 mg/dL HDL Cholesterol (POC) 60.0 35 - 130 mg/dL VLDL (POC) 18.2 MG/DL  
 LDL Cholesterol (POC) 96.8 0.0 - 130.0 MG/DL TChol/HDL Ratio (POC) 2.9 0.0 - 4.0 AMB POC COMPREHENSIVE METABOLIC PANEL Result Value Ref Range GLUCOSE 109.0 75 - 110 mg/dL BUN 20.0 9 - 20 mg/dL Creatinine (POC) 0.9 0.8 - 1.5 mg/dL Sodium (POC) 146.0 (A) 137 - 145 MMOL/L Potassium (POC) 5.4 (A) 3.6 - 5.0 MMOL/L  
 CHLORIDE 106.0 98 - 107 MMOL/L  
 CO2 28.0 22 - 32 MMOL/L  
 CALCIUM 10.4 (A) 8.4 - 10.2 mg/dL TOTAL PROTEIN 7.5 6.3 - 8.2 g/dL ALBUMIN 4.5 3.9 - 5.4 g/dL AST (POC) 25 14 - 36 U/L  
 ALT (POC) 31 9 - 52 U/L ALKALINE PHOS 98.0 38 - 126 U/L  
 TOTAL BILIRUBIN 1.1 0.2 - 1.3 mg/dL  
 eGFR (POC) 86.8 AMB POC CK (CPK) Result Value Ref Range CK (CPK) (POC) 115.0 30 - 135 U/L Advice/Referrals/Counseling Education and counseling provided: 
Are appropriate based on today's review and evaluation End-of-Life planning (with patient's consent) Pneumococcal Vaccine Influenza Vaccine Colorectal cancer screening tests Assessment/Plan ASSESSMENT:  
1. Essential hypertension 2. Glucose intolerance (impaired glucose tolerance) 3. Mixed hyperlipidemia 4. Mild intermittent asthma without complication 5. Primary osteoarthritis involving multiple joints 6. Severe obesity (BMI 35.0-39. 9) with comorbidity ([address] 7. Migraine without status migrainosus, not intractable, unspecified migraine type 8. Duodenal ulcer 9. Medicare annual wellness visit, subsequent 10. Prostate cancer screening Impression 1. Hypertension is controlled to continue current therapy reviewed with him 2.  Glucose intolerance repeat status pending and prior labs reviewed on make adjustments if necessary. 3 hyperlipidemia prior labs reviewed and repeat status pending I will adjust if needed. 4.  Asthma that is stable samples of Dulera given 5. DJD that is stable 6. Obesity we discussed diet, exercise and weight reduction for overall health benefit. 7.  Migraine headaches there is stable 8. History of duodenal ulcer currently stable without any evidence of recurrence He has already received his flu shot at work. Medicare subsequent annual wellness examination screening questionnaire is completed today. The results were reviewed with him and his questions were answered. Lifestyle recommendations and modifications discussed and made. I will call the lab results and make further recommendations or adjustments if necessary. Follow-up as scheduled again for 6 months or sooner should there be a problem. PLAN: 
. Orders Placed This Encounter  T4, FREE  
 METABOLIC PANEL, COMPREHENSIVE  
 TSH 3RD GENERATION  
 LIPID PANEL  
 CK  
 HEMOGLOBIN A1C WITH EAG  
 PROSTATE SPECIFIC AG  
 AMB POC COMPLETE CBC,AUTOMATED ENTER  AMB POC URINALYSIS DIP STICK AUTO W/ MICRO   
 
 
 
ATTENTION:  
This medical record was transcribed using an electronic medical records system. Although proofread, it may and can contain electronic and spelling errors. Other human spelling and other errors may be present. Corrections may be executed at a later time. Please feel free to contact us for any clarifications as needed. Follow-up Disposition: 
Return in about 6 months (around 5/8/2019). Lauren Rossi MD 
 
Recommended healthy diet low in carbohydrates, fats, sodium and cholesterol. Recommended regular cardiovascular exercise 3-6 times per week for 30-60 minutes daily. Current Outpatient Medications Medication Sig Dispense Refill  SUMAtriptan (IMITREX) 100 mg tablet TAKE ONE TABLET BY MOUTH DAILY AS DIRECTED PER MD 9 Tab 2  
 amLODIPine (NORVASC) 5 mg tablet TAKE ONE TABLET BY MOUTH DAILY 30 Tab 11  
 atorvastatin (LIPITOR) 40 mg tablet TAKE ONE TABLET BY MOUTH DAILY 30 Tab 11  
 mometasone-formoterol (DULERA) 200-5 mcg/actuation HFA inhaler Take 2 Puffs by inhalation two (2) times a day. No results found for any visits on 11/08/18. Verbal and written instructions (see AVS) provided. Patient expresses understanding of diagnosis and treatment plan.  
 
Lauren Rossi MD

## 2018-11-08 ENCOUNTER — OFFICE VISIT (OUTPATIENT)
Dept: INTERNAL MEDICINE CLINIC | Age: 68
End: 2018-11-08

## 2018-11-08 VITALS
OXYGEN SATURATION: 95 % | SYSTOLIC BLOOD PRESSURE: 122 MMHG | HEART RATE: 68 BPM | BODY MASS INDEX: 36.27 KG/M2 | DIASTOLIC BLOOD PRESSURE: 80 MMHG | WEIGHT: 282.6 LBS | HEIGHT: 74 IN | RESPIRATION RATE: 18 BRPM

## 2018-11-08 DIAGNOSIS — R73.02 GLUCOSE INTOLERANCE (IMPAIRED GLUCOSE TOLERANCE): ICD-10-CM

## 2018-11-08 DIAGNOSIS — E66.01 SEVERE OBESITY (BMI 35.0-39.9) WITH COMORBIDITY (HCC): ICD-10-CM

## 2018-11-08 DIAGNOSIS — G43.909 MIGRAINE WITHOUT STATUS MIGRAINOSUS, NOT INTRACTABLE, UNSPECIFIED MIGRAINE TYPE: ICD-10-CM

## 2018-11-08 DIAGNOSIS — J45.20 MILD INTERMITTENT ASTHMA WITHOUT COMPLICATION: ICD-10-CM

## 2018-11-08 DIAGNOSIS — Z00.00 MEDICARE ANNUAL WELLNESS VISIT, SUBSEQUENT: ICD-10-CM

## 2018-11-08 DIAGNOSIS — Z12.5 PROSTATE CANCER SCREENING: ICD-10-CM

## 2018-11-08 DIAGNOSIS — M15.9 PRIMARY OSTEOARTHRITIS INVOLVING MULTIPLE JOINTS: ICD-10-CM

## 2018-11-08 DIAGNOSIS — I10 ESSENTIAL HYPERTENSION: Primary | ICD-10-CM

## 2018-11-08 DIAGNOSIS — K26.9 DUODENAL ULCER: ICD-10-CM

## 2018-11-08 DIAGNOSIS — E78.2 MIXED HYPERLIPIDEMIA: ICD-10-CM

## 2018-11-08 LAB
BACTERIA UA POCT, BACTPOCT: NORMAL
BILIRUB UR QL STRIP: NEGATIVE
CASTS UA POCT: NORMAL
CLUE CELLS, CLUEPOCT: NEGATIVE
CRYSTALS UA POCT, CRYSPOCT: NEGATIVE
EPITHELIAL CELLS POCT: NORMAL
GLUCOSE UR-MCNC: NEGATIVE MG/DL
GRAN# POC: 5.5 K/UL (ref 2–7.8)
GRAN% POC: 72.9 % (ref 37–92)
HCT VFR BLD CALC: 46.9 % (ref 37–51)
HGB BLD-MCNC: 15.5 G/DL (ref 12–18)
KETONES P FAST UR STRIP-MCNC: NEGATIVE MG/DL
LY# POC: 1.5 K/UL (ref 0.6–4.1)
LY% POC: 21 % (ref 10–58.5)
MCH RBC QN: 30.7 PG (ref 26–32)
MCHC RBC-ENTMCNC: 33.1 G/DL (ref 30–36)
MCV RBC: 93 FL (ref 80–97)
MID #, POC: 0.4 K/UL (ref 0–1.8)
MID% POC: 6.1 % (ref 0.1–24)
MUCUS UA POCT, MUCPOCT: NORMAL
PH UR STRIP: 6 [PH] (ref 5–7)
PLATELET # BLD: 365 K/UL (ref 140–440)
PROT UR QL STRIP: NEGATIVE
RBC # BLD: 5.06 M/UL (ref 4.2–6.3)
RBC UA POCT, RBCPOCT: NORMAL
SP GR UR STRIP: 1.02 (ref 1.01–1.02)
TRICH UA POCT, TRICHPOC: NEGATIVE
UA UROBILINOGEN AMB POC: NORMAL (ref 0.2–1)
URINALYSIS CLARITY POC: CLEAR
URINALYSIS COLOR POC: YELLOW
URINE BLOOD POC: NEGATIVE
URINE CULT COMMENT, POCT: NORMAL
URINE LEUKOCYTES POC: NEGATIVE
URINE NITRITES POC: NEGATIVE
WBC # BLD: 7.4 K/UL (ref 4.1–10.9)
WBC UA POCT, WBCPOCT: NORMAL
YEAST UA POCT, YEASTPOC: NEGATIVE

## 2018-11-08 NOTE — LETTER
11/13/2018 6:54 PM 
 
Mr. Katherine Gallegos [address] [id number] Dear Katherine Gallegos: 
 
Please find your most recent results below. Resulted Orders AMB POC COMPLETE CBC,AUTOMATED ENTER Result Value Ref Range WBC (POC) 7.4 4.1 - 10.9 K/uL  
 RBC (POC) 5.06 4.20 - 6.30 M/uL  
 HGB (POC) 15.5 12.0 - 18.0 g/dL HCT (POC) 46.9 37.0 - 51.0 %  
 MCV (POC) 93.0 80.0 - 97.0 fL  
 MCH (POC) 30.7 26.0 - 32.0 pg  
 MCHC (POC) 33.1 30.0 - 36.0 g/dL PLATELET (POC) 137.2 140.0 - 440.0 K/uL  
 ABS. LYMPHS (POC) 1.5 0.6 - 4.1 K/uL LYMPHOCYTES (POC) 21.0 10.0 - 58.5 % Mid # (POC) 0.4 0.0 - 1.8 K/uL MID% POC 6.1 0.1 - 24.0 %  
 ABS. GRANS (POC) 5.5 2.0 - 7.8 K/uL GRANULOCYTES (POC) 72.9 37.0 - 92.0 % AMB POC URINALYSIS DIP STICK AUTO W/ MICRO Result Value Ref Range Color (UA POC) Yellow Clarity (UA POC) Clear Glucose (UA POC) Negative Negative Bilirubin (UA POC) Negative Negative Ketones (UA POC) Negative Negative Specific gravity (UA POC) 1.020 1.010 - 1.025 Blood (UA POC) Negative Negative pH (UA POC) 6.0 5.0 - 7.0 Protein (UA POC) Negative Negative Urobilinogen (UA POC) normal 0.2 - 1 Nitrites (UA POC) Negative Negative Leukocyte esterase (UA POC) Negative Negative Epithelial cells (UA POC) Few Mucus (UA POC) None WBCs (UA POC) 5-10 RBCs (UA POC) 0-2 Casts (UA POC) None Negative Crystals (UA POC) Negative Negative Clue Cells (UA POC) NEGATIVE Trichomonas (UA POC) Negative Yeast (UA POC) Negative Bacteria (UA POC) None Negative URINE CULT COMMENT (UA POC) Culture Not Indicated T4, FREE Result Value Ref Range T4, Free 1.18 0.82 - 1.77 ng/dL Narrative Performed at:  [address]  [id number] : Rhonda Portillo MD, Phone:  [phone] METABOLIC PANEL, COMPREHENSIVE Result Value Ref Range Glucose 109 (H) 65 - 99 mg/dL BUN 17 8 - 27 mg/dL Creatinine 0.95 0.76 - 1.27 mg/dL GFR est non-AA 81 >59 mL/min/1.73 GFR est AA 93 >59 mL/min/1.73  
 BUN/Creatinine ratio 18 10 - 24 Sodium 141 134 - 144 mmol/L Potassium 4.9 3.5 - 5.2 mmol/L Chloride 102 96 - 106 mmol/L  
 CO2 24 20 - 29 mmol/L Calcium 10.2 8.6 - 10.2 mg/dL Protein, total 7.0 6.0 - 8.5 g/dL Albumin 4.2 3.5 - 4.8 g/dL GLOBULIN, TOTAL 2.8 1.5 - 4.5 g/dL A-G Ratio 1.5 1.2 - 2.2 Bilirubin, total 0.5 0.0 - 1.2 mg/dL Alk. phosphatase 106 39 - 117 IU/L  
 AST (SGOT) 19 0 - 40 IU/L  
 ALT (SGPT) 20 0 - 44 IU/L Narrative Performed at:  [address]  [id number] : Lyndsay Proctor MD, Phone:  [phone] TSH 3RD GENERATION Result Value Ref Range TSH 1.790 0.450 - 4.500 uIU/mL Narrative Performed at:  [address]  [id number] : Lyndsay Proctor MD, Phone:  [phone] LIPID PANEL Result Value Ref Range Cholesterol, total 164 100 - 199 mg/dL Triglyceride 95 0 - 149 mg/dL HDL Cholesterol 45 >39 mg/dL VLDL, calculated 19 5 - 40 mg/dL LDL, calculated 100 (H) 0 - 99 mg/dL Narrative Performed at:  [address]  [id number] : Lyndsay Proctor MD, Phone:  [phone] CK Result Value Ref Range Creatine Kinase,Total 118 24 - 204 U/L Narrative Performed at:  [address]  [id number] : Lyndsay Proctor MD, Phone:  [phone] HEMOGLOBIN A1C WITH EAG Result Value Ref Range Hemoglobin A1c 6.0 (H) 4.8 - 5.6 % Comment:  
            Prediabetes: 5.7 - 6.4 Diabetes: >6.4 Glycemic control for adults with diabetes: <7.0 Estimated average glucose 126 mg/dL Narrative Performed at:  [address]  [id number] : Russel Iyer MD, Phone:  [phone] PSA, DIAGNOSTIC (PROSTATE SPECIFIC AG) Result Value Ref Range Prostate Specific Ag 2.5 0.0 - 4.0 ng/mL Comment:  
   Roche ECLIA methodology. According to the [employer] Association, Serum PSA should 
decrease and remain at undetectable levels after radical 
prostatectomy. The AUA defines biochemical recurrence as an initial 
PSA value 0.2 ng/mL or greater followed by a subsequent confirmatory PSA value 0.2 ng/mL or greater. Values obtained with different assay methods or kits cannot be used 
interchangeably. Results cannot be interpreted as absolute evidence 
of the presence or absence of malignant disease. Narrative Performed at:  [address]  [id number] : Russel Iyer MD, Phone:  [phone] RECOMMENDATIONS: 
Your labs are okay so continue current treatment. Please call me if you have any questions: [phone] Sincerely, Lily Marte MD

## 2018-11-08 NOTE — PROGRESS NOTES
Chief Complaint Patient presents with  Hypertension 6 month follow up 1. Have you been to the ER, urgent care clinic since your last visit? Hospitalized since your last visit? No 
 
2. Have you seen or consulted any other health care providers outside of the [address] [place] [address] since your last visit? Include any pap smears or colon screening. No 
Visit Vitals /80 (BP 1 Location: Left arm, BP Patient Position: Sitting) Pulse 68 Resp 18 Ht 6' 2\" (1.88 m) Wt 282 lb 9.6 oz (128.2 kg) SpO2 95% BMI 36.28 kg/m²

## 2018-11-08 NOTE — PATIENT INSTRUCTIONS
Asthma Action Plan: After Your Visit Your Care Instructions An asthma action plan is based on peak flow and asthma symptoms. Sorting symptoms and peak flow into red, yellow, and green \"zones\" can help you know how bad your asthma is and what actions you should take. Work with your doctor to make your plan. An action plan may include: · The peak flow readings and symptoms for each zone. · What medicines to take in each zone. · When to call a doctor. · A list of emergency contact numbers. · A list of your asthma triggers. Follow-up care is a key part of your treatment and safety. Be sure to make and go to all appointments, and call your doctor if you are having problems. It's also a good idea to know your test results and keep a list of the medicines you take. How can you care for yourself at home? · Take your daily medicines to help minimize long-term damage and avoid asthma attacks. · Check your peak flow every morning and evening. This is the best way to know how well your lungs are working. · Check your action plan to see what zone you are in. 
¨ If you are in the green zone, keep taking your daily asthma medicines as prescribed. ¨ If you are in the yellow zone, you may be having or will soon have an asthma attack. You may not have any symptoms, but your lungs are not working as well as they should. Take the medicines listed in your action plan. If you stay in the yellow zone, your doctor may need to increase the dose or add a medicine. ¨ If you are in the red zone, follow your action plan. If your symptoms or peak flow don't improve soon, you may need to go to the emergency room or be admitted to the hospital. 
· Use an asthma diary. Write down your peak flow readings in the asthma diary. If you have an attack, write down what caused it (if you know), the symptoms, and what medicine you took.  
· Make sure you know how and when to call your doctor or go to the hospital. 
 · Take both the asthma action plan and the asthma diaryalong with your peak flow meter and medicineswhen you see your doctor. Tell your doctor if you are having trouble following your action plan. When should you call for help? Call 911 anytime you think you may need emergency care. For example, call if: 
· You have severe trouble breathing. Call your doctor now or seek immediate medical care if: 
· Your symptoms do not get better after you have followed your asthma action plan. · You cough up yellow, dark brown, or bloody mucus (sputum). Watch closely for changes in your health, and be sure to contact your doctor if: 
· Your coughing and wheezing get worse. · You need to use quick-relief medicine on more than 2 days a week (unless it is just for exercise). · You need help figuring out what is triggering your asthma attacks. Where can you learn more? Go to [employer] Enter [phone] in the search box to learn more about \"Asthma Action Plan: After Your Visit. \"  
© 3284-0301 Healthwise, [employer]. Care instructions adapted under license by [facility] (which disclaims liability or warranty for this information). This care instruction is for use with your licensed healthcare professional. If you have questions about a medical condition or this instruction, always ask your healthcare professional. [place] [address] any warranty or liability for your use of this information. Content Version: 36.6.619325; Last Revised: March 9, 2012

## 2018-11-09 LAB
ALBUMIN SERPL-MCNC: 4.2 G/DL (ref 3.5–4.8)
ALBUMIN/GLOB SERPL: 1.5 {RATIO} (ref 1.2–2.2)
ALP SERPL-CCNC: 106 IU/L (ref 39–117)
ALT SERPL-CCNC: 20 IU/L (ref 0–44)
AST SERPL-CCNC: 19 IU/L (ref 0–40)
BILIRUB SERPL-MCNC: 0.5 MG/DL (ref 0–1.2)
BUN SERPL-MCNC: 17 MG/DL (ref 8–27)
BUN/CREAT SERPL: 18 (ref 10–24)
CALCIUM SERPL-MCNC: 10.2 MG/DL (ref 8.6–10.2)
CHLORIDE SERPL-SCNC: 102 MMOL/L (ref 96–106)
CHOLEST SERPL-MCNC: 164 MG/DL (ref 100–199)
CK SERPL-CCNC: 118 U/L (ref 24–204)
CO2 SERPL-SCNC: 24 MMOL/L (ref 20–29)
CREAT SERPL-MCNC: 0.95 MG/DL (ref 0.76–1.27)
EST. AVERAGE GLUCOSE BLD GHB EST-MCNC: 126 MG/DL
GLOBULIN SER CALC-MCNC: 2.8 G/DL (ref 1.5–4.5)
GLUCOSE SERPL-MCNC: 109 MG/DL (ref 65–99)
HBA1C MFR BLD: 6 % (ref 4.8–5.6)
HDLC SERPL-MCNC: 45 MG/DL
LDLC SERPL CALC-MCNC: 100 MG/DL (ref 0–99)
POTASSIUM SERPL-SCNC: 4.9 MMOL/L (ref 3.5–5.2)
PROT SERPL-MCNC: 7 G/DL (ref 6–8.5)
PSA SERPL-MCNC: 2.5 NG/ML (ref 0–4)
SODIUM SERPL-SCNC: 141 MMOL/L (ref 134–144)
T4 FREE SERPL-MCNC: 1.18 NG/DL (ref 0.82–1.77)
TRIGL SERPL-MCNC: 95 MG/DL (ref 0–149)
TSH SERPL DL<=0.005 MIU/L-ACNC: 1.79 UIU/ML (ref 0.45–4.5)
VLDLC SERPL CALC-MCNC: 19 MG/DL (ref 5–40)

## 2018-11-29 NOTE — PERIOP NOTES
[facility] Ambulatory Surgery Unit Pre-operative Instructions for Endo Procedures Procedure Date  12/5            Tentative Arrival Time 9202 1. On the day of your procedure, please report to the Ambulatory Surgery Unit Registration Desk and sign in at your designated time. The Ambulatory Surgery Unit is located in [facility] on the [facility] side of the [facility] across from the [address] [place] [address]. Please have all of your health insurance cards and a photo ID. 2. You must have someone with you to drive you home, as you should not drive a car for 24 hours following anesthesia. Please make arrangements for a responsible adult friend or family member to stay with you for at least the first 24 hours after your procedure. 3. Do not have anything to eat or drink (including water, gum, mints, coffee, juice) after 11:59 PM 12/4. This may not apply to medications prescribed by your physician. (Please note below the special instructions with medications to take the morning of your procedure.) 4. If applicable, follow the clear liquid diet and bowel prep instructions provided by your physician's office. If you do not have this information, or have any questions, please contact your physician's office. 5. We recommend you do not drink any alcoholic beverages for 24 hours before and after your procedure. 6. Contact your surgeons office for instructions on the following medications: non-steroidal anti-inflammatory drugs (i.e. Advil, Aleve), vitamins, and supplements. (Some surgeons will want you to stop these medications prior to surgery and others may allow you to take them) **If you are currently taking Plavix, Coumadin, Aspirin and/or other blood-thinning agents, contact your surgeon for instructions. ** Your surgeon will partner with the physician prescribing these medications to determine if it is safe to stop or if you need to continue taking.  Please do not stop taking these medications without instructions from your surgeon. 7. In an effort to help prevent surgical site infection, we ask that you shower with an anti-bacterial soap (i.e. Dial or Safeguard) on the morning of your procedure. Do not apply any lotions, powders, or deodorants after showering. 8. Wear comfortable clothes. Wear glasses instead of contacts. Do not bring any jewelry or money (other than copays or fees as instructed). Do not wear make-up, particularly mascara, the morning of your procedure. Wear your hair loose or down, no ponytails, buns, meron pins or clips. All body piercings must be removed. 9. You should understand that if you do not follow these instructions your procedure may be cancelled. If your physical condition changes (i.e. fever, cold or flu) please contact your surgeon as soon as possible. 10. It is important that you be on time. If a situation occurs where you may be late, or if you have any questions or problems, please call [phone]. 11. Your procedure time may be subject to change. You will receive a phone call the day prior to confirm your arrival time. Special Instructions: Take all medications and inhalers, as prescribed, on the morning of surgery with a sip of water EXCEPT: none I understand a pre-operative phone call will be made to verify my procedure time. In the event that I am not available, I give permission for a message to be left on my answering service and/or with another person? yes Reviewed by phone with pt, verbalized understanding 
 
 ___________________      ___________________      ___________________ 
(Signature of Patient)          (Witness)                   (Date and Time)

## 2018-12-04 ENCOUNTER — ANESTHESIA EVENT (OUTPATIENT)
Dept: SURGERY | Age: 68
End: 2018-12-04
Payer: COMMERCIAL

## 2018-12-05 ENCOUNTER — HOSPITAL ENCOUNTER (OUTPATIENT)
Age: 68
Setting detail: OUTPATIENT SURGERY
Discharge: HOME | End: 2018-12-05
Attending: COLON & RECTAL SURGERY | Admitting: COLON & RECTAL SURGERY
Payer: COMMERCIAL

## 2018-12-05 ENCOUNTER — ANESTHESIA (OUTPATIENT)
Dept: SURGERY | Age: 68
End: 2018-12-05
Payer: COMMERCIAL

## 2018-12-05 VITALS
TEMPERATURE: 97.9 F | HEIGHT: 74 IN | WEIGHT: 279 LBS | DIASTOLIC BLOOD PRESSURE: 66 MMHG | HEART RATE: 58 BPM | SYSTOLIC BLOOD PRESSURE: 120 MMHG | RESPIRATION RATE: 16 BRPM | BODY MASS INDEX: 35.81 KG/M2 | OXYGEN SATURATION: 96 %

## 2018-12-05 PROBLEM — Z12.11 ENCOUNTER FOR COLONOSCOPY DUE TO HISTORY OF ADENOMATOUS COLONIC POLYPS: Status: ACTIVE | Noted: 2018-12-05

## 2018-12-05 PROBLEM — Z86.010 ENCOUNTER FOR COLONOSCOPY DUE TO HISTORY OF ADENOMATOUS COLONIC POLYPS: Status: ACTIVE | Noted: 2018-12-05

## 2018-12-05 PROCEDURE — 76060000073 HC AMB SURG ANES FIRST 0.5 HR: Performed by: COLON & RECTAL SURGERY

## 2018-12-05 PROCEDURE — 76210000050 HC AMBSU PH II REC 0.5 TO 1 HR: Performed by: COLON & RECTAL SURGERY

## 2018-12-05 PROCEDURE — 76210000040 HC AMBSU PH I REC FIRST 0.5 HR: Performed by: COLON & RECTAL SURGERY

## 2018-12-05 PROCEDURE — 74011250636 HC RX REV CODE- 250/636: Performed by: ANESTHESIOLOGY

## 2018-12-05 PROCEDURE — 88305 TISSUE EXAM BY PATHOLOGIST: CPT

## 2018-12-05 PROCEDURE — 77030013992 HC SNR POLYP ENDOSC BSC -B: Performed by: COLON & RECTAL SURGERY

## 2018-12-05 PROCEDURE — 77030009426 HC FCPS BIOP ENDOSC BSC -B: Performed by: COLON & RECTAL SURGERY

## 2018-12-05 PROCEDURE — 74011250636 HC RX REV CODE- 250/636

## 2018-12-05 PROCEDURE — 77030020255 HC SOL INJ LR 1000ML BG: Performed by: COLON & RECTAL SURGERY

## 2018-12-05 PROCEDURE — 76030000002 HC AMB SURG OR TIME FIRST 0.: Performed by: COLON & RECTAL SURGERY

## 2018-12-05 PROCEDURE — 77030021352 HC CBL LD SYS DISP COVD -B: Performed by: COLON & RECTAL SURGERY

## 2018-12-05 RX ORDER — SODIUM CHLORIDE 0.9 % (FLUSH) 0.9 %
5-10 SYRINGE (ML) INJECTION EVERY 8 HOURS
Status: DISCONTINUED | OUTPATIENT
Start: 2018-12-05 | End: 2018-12-05 | Stop reason: HOSPADM

## 2018-12-05 RX ORDER — DIPHENHYDRAMINE HYDROCHLORIDE 50 MG/ML
12.5 INJECTION, SOLUTION INTRAMUSCULAR; INTRAVENOUS AS NEEDED
Status: DISCONTINUED | OUTPATIENT
Start: 2018-12-05 | End: 2018-12-05 | Stop reason: HOSPADM

## 2018-12-05 RX ORDER — SODIUM CHLORIDE, SODIUM LACTATE, POTASSIUM CHLORIDE, CALCIUM CHLORIDE 600; 310; 30; 20 MG/100ML; MG/100ML; MG/100ML; MG/100ML
25 INJECTION, SOLUTION INTRAVENOUS CONTINUOUS
Status: DISCONTINUED | OUTPATIENT
Start: 2018-12-05 | End: 2018-12-05 | Stop reason: HOSPADM

## 2018-12-05 RX ORDER — LIDOCAINE HYDROCHLORIDE 10 MG/ML
0.1 INJECTION, SOLUTION EPIDURAL; INFILTRATION; INTRACAUDAL; PERINEURAL AS NEEDED
Status: DISCONTINUED | OUTPATIENT
Start: 2018-12-05 | End: 2018-12-05 | Stop reason: HOSPADM

## 2018-12-05 RX ORDER — PROPOFOL 10 MG/ML
INJECTION, EMULSION INTRAVENOUS AS NEEDED
Status: DISCONTINUED | OUTPATIENT
Start: 2018-12-05 | End: 2018-12-05 | Stop reason: HOSPADM

## 2018-12-05 RX ORDER — ONDANSETRON 2 MG/ML
4 INJECTION INTRAMUSCULAR; INTRAVENOUS AS NEEDED
Status: DISCONTINUED | OUTPATIENT
Start: 2018-12-05 | End: 2018-12-05 | Stop reason: HOSPADM

## 2018-12-05 RX ORDER — SODIUM CHLORIDE 0.9 % (FLUSH) 0.9 %
5-10 SYRINGE (ML) INJECTION AS NEEDED
Status: DISCONTINUED | OUTPATIENT
Start: 2018-12-05 | End: 2018-12-05 | Stop reason: HOSPADM

## 2018-12-05 RX ORDER — FENTANYL CITRATE 50 UG/ML
25 INJECTION, SOLUTION INTRAMUSCULAR; INTRAVENOUS
Status: DISCONTINUED | OUTPATIENT
Start: 2018-12-05 | End: 2018-12-05 | Stop reason: HOSPADM

## 2018-12-05 RX ADMIN — PROPOFOL 50 MG: 10 INJECTION, EMULSION INTRAVENOUS at 08:36

## 2018-12-05 RX ADMIN — PROPOFOL 50 MG: 10 INJECTION, EMULSION INTRAVENOUS at 08:32

## 2018-12-05 RX ADMIN — SODIUM CHLORIDE, POTASSIUM CHLORIDE, SODIUM LACTATE AND CALCIUM CHLORIDE: 600; 310; 30; 20 INJECTION, SOLUTION INTRAVENOUS at 08:14

## 2018-12-05 RX ADMIN — PROPOFOL 50 MG: 10 INJECTION, EMULSION INTRAVENOUS at 08:30

## 2018-12-05 RX ADMIN — PROPOFOL 100 MG: 10 INJECTION, EMULSION INTRAVENOUS at 08:24

## 2018-12-05 RX ADMIN — PROPOFOL 50 MG: 10 INJECTION, EMULSION INTRAVENOUS at 08:39

## 2018-12-05 RX ADMIN — PROPOFOL 50 MG: 10 INJECTION, EMULSION INTRAVENOUS at 08:43

## 2018-12-05 RX ADMIN — PROPOFOL 50 MG: 10 INJECTION, EMULSION INTRAVENOUS at 08:27

## 2018-12-05 RX ADMIN — PROPOFOL 50 MG: 10 INJECTION, EMULSION INTRAVENOUS at 08:34

## 2018-12-05 NOTE — ANESTHESIA POSTPROCEDURE EVALUATION
Procedure(s): 
COLONOSCOPY 
COLON BIOPSY. Anesthesia Post Evaluation Multimodal analgesia: multimodal analgesia not used between 6 hours prior to anesthesia start to PACU discharge Patient location during evaluation: bedside Patient participation: complete - patient participated Level of consciousness: awake and alert Pain score: 0 Airway patency: patent Anesthetic complications: no 
Cardiovascular status: acceptable Respiratory status: acceptable Hydration status: acceptable Post anesthesia nausea and vomiting:  none Visit Vitals /66 Pulse (!) 58 Temp 36.6 °C (97.9 °F) Resp 16 Ht 6' 2\" (1.88 m) Wt 126.6 kg (279 lb) SpO2 96% BMI 35.82 kg/m²

## 2018-12-05 NOTE — DISCHARGE INSTRUCTIONS
Ada Mohs  [id number]  1950    COLON DISCHARGE INSTRUCTIONS  Discomfort:  Redness at IV site- apply warm compress to area; if redness or soreness persist- contact your physician  There may be a slight amount of blood passed from the rectum  Gaseous discomfort- walking, belching will help relieve any discomfort  You may not operate a vehicle for 24 hours  You may not engage in an occupation involving machinery or appliances for rest of today  You may not drink alcoholic beverages for at least 24 hours  Avoid making any critical decisions for at least 24 hour  DIET:   Regular diet. - however -  remember your colon is empty and a heavy meal will produce gas. Avoid these foods:  vegetables, fried / greasy foods, carbonated drinks for today     ACTIVITY:  You may not  resume your normal daily activities it is recommended that you spend the remainder of the day resting -  avoid any strenuous activity. CALL M.D. ANY SIGN OF:   Increasing pain, nausea, vomiting  Abdominal distension (swelling)  New increased bleeding (oral or rectal)  Fever (chills)  Pain in chest area  Bloody discharge from nose or mouth  Shortness of breath    You may not  take any Advil, Aspirin, Ibuprofen, Motrin, Aleve, or Goodys for 10 days, ONLY  Tylenol as needed for pain. Post procedure diagnosis:  COLON POLYPS  Follow-up Instructions:   Call Dr. Armaan Mireles if any questions  Telephone #  [phone]  Additional instructions ;  followup c-scope in 3 years. DO NOT TAKE SLEEPING MEDICATIONS OR ANTIANXIETY MEDICATIONS WHILE TAKING NARCOTIC PAIN MEDICATIONS,  ESPECIALLY THE NIGHT OF ANESTHESIA. CPAP PATIENTS BE SURE TO WEAR MACHINE WHENEVER NAPPING OR SLEEPING.     DISCHARGE SUMMARY from Nurse    The following personal items collected during your admission are returned to you:   Dental Appliance: Dental Appliances: None  Vision: Visual Aid: Glasses, With patient  Hearing Aid:    Jewelry:    Clothing:    Other Valuables: Valuables sent to safe:        PATIENT INSTRUCTIONS:    After General Anesthesia or Intravenous Sedation, for 24 hours or while taking prescription Narcotics:        Someone should be with you for the next 24 hours. For your own safety, a responsible adult must drive you home. · Limit your activities  · Recommended activity: Rest today, up with assistance today. Do not climb stairs or shower unattended for the next 24 hours. · Please start with a soft bland diet and advance as tolerated (no nausea) to regular diet. · If you have a sore throat you should try the following: fluids, warm salt water gargles, or throat lozenges. If it does not improve after several days please follow up with your primary physician. · Do not drive and operate hazardous machinery  · Do not make important personal or business decisions  · Do  not drink alcoholic beverages  · If you have not urinated within 8 hours after discharge, please contact your surgeon on call. Report the following to your surgeon:  · Excessive pain, swelling, redness or odor of or around the surgical area  · Temperature over 100.5  · Nausea and vomiting lasting longer than 4 hours or if unable to take medications  · Any signs of decreased circulation or nerve impairment to extremity: change in color, persistent  numbness, tingling, coldness or increase pain      · You will receive a Post Operative Call from one of the Recovery Room Nurses on the day after your surgery to check on you. It is very important for us to know how you are recovering after your surgery. If you have an issue or need to speak with someone, please call your surgeon, do not wait for the post operative call. · You may receive an e-mail or letter in the mail from [employer] regarding your experience with us in the Ambulatory Surgery Unit. Your feedback is valuable to us and we appreciate your participation in the survey.        · If the above instructions are not adequate, please contact Eleni Gomez RN, [place] anesthesia Nurse Manager or our Anesthesiologist, at [phone]. If you are having problems after your surgery, call the physician at his office number. · We wish you a speedy recovery ? What to do at Home:      *  Please give a list of your current medications to your Primary Care Provider. *  Please update this list whenever your medications are discontinued, doses are      changed, or new medications (including over-the-counter products) are added. *  Please carry medication information at all times in case of emergency situations. These are general instructions for a healthy lifestyle:    No smoking/ No tobacco products/ Avoid exposure to second hand smoke    Surgeon General's Warning:  Quitting smoking now greatly reduces serious risk to your health. Obesity, smoking, and sedentary lifestyle greatly increases your risk for illness    A healthy diet, regular physical exercise & weight monitoring are important for maintaining a healthy lifestyle    You may be retaining fluid if you have a history of heart failure or if you experience any of the following symptoms:  Weight gain of 3 pounds or more overnight or 5 pounds in a week, increased swelling in our hands or feet or shortness of breath while lying flat in bed. Please call your doctor as soon as you notice any of these symptoms; do not wait until your next office visit. Recognize signs and symptoms of STROKE:    B - Balance  E - Eyes    F-  Face looks uneven    A-  Arms unable to move or move even    S-  Speech slurred or non-existent    T-  Time-call 911 as soon as signs and symptoms begin-DO NOT go       Back to bed or wait to see if you get better-TIME IS BRAIN. If you have not received your influenza and/or pneumococcal vaccine, please follow up with your primary care physician. The discharge information has been reviewed with the patient and spouse.   The patient and spouse verbalized understanding.

## 2018-12-05 NOTE — BRIEF OP NOTE
BRIEF OPERATIVE NOTE Date of Procedure: 12/5/2018 Preoperative Diagnosis: HX OF POLYPS Postoperative Diagnosis: COLON POLYPS Procedure(s): 
COLONOSCOPY 
COLON BIOPSY Surgeon(s) and Role: Aung Huber MD - Primary Surgical Assistant: None Surgical Staff: 
Circ-1: Carolyn Garcia RN Scrub Tech-1: Jean Claude Bunch Event Time In Time Out Incision Start 2848 Incision Close 0845 Anesthesia: MAC Estimated Blood Loss: None Specimens:  
ID Type Source Tests Collected by Time Destination 1 : LEFT TRANSVERSE COLON POLYP BIOPSY  Preservative Colon, Transverse  Aung Huber MD 12/5/2018 3597 Pathology 2 : PROXIMAL DESCENDING COLON POLYP BIOPSY  Preservative Colon, Descending  Aung Huber MD 12/5/2018 0438 Pathology 3 : MID DESCENDING COLON POLYP BIOPSY  Preservative Colon, Descending  Aung Huber MD 12/5/2018 1142 Pathology Findings: 3 5 to 6 mm polyps removed; Otherwise normal exam.  
Complications: none. Implants: * No implants in log *

## 2018-12-05 NOTE — ANESTHESIA PREPROCEDURE EVALUATION
Anesthetic History No history of anesthetic complications Review of Systems / Medical History Patient summary reviewed, nursing notes reviewed and pertinent labs reviewed Pulmonary COPD: mild Asthma : well controlled Neuro/Psych Headaches (migraines) Cardiovascular Hypertension Hyperlipidemia Exercise tolerance: >4 METS 
  
GI/Hepatic/Renal 
  
 
 
 
 
 
 Endo/Other Morbid obesity and arthritis Other Findings Physical Exam 
 
Airway Mallampati: III 
TM Distance: 4 - 6 cm Neck ROM: decreased range of motion Mouth opening: Normal 
 
 Cardiovascular Rhythm: regular Rate: normal 
 
 
 
 Dental 
No notable dental hx Pulmonary Breath sounds clear to auscultation Abdominal 
GI exam deferred Other Findings Anesthetic Plan ASA: 2 Anesthesia type: general and total IV anesthesia Induction: Intravenous Anesthetic plan and risks discussed with: Patient

## 2018-12-05 NOTE — OP NOTES
Yadiel  MR#: [id number]  : 1950  ACCOUNT #: [de-identified]   DATE OF SERVICE: 2018    PREOPERATIVE DIAGNOSIS:  History of previous colonic polyps. POSTOPERATIVE DIAGNOSIS:  History of previous colonic polyps with 3 small 5-6 mm polyps at the left transverse proximal descending, and mid descending colon; all 3 polyps removed with biopsy and fulguration in their entirety. PROCEDURE PERFORMED:  Total colonoscopy and biopsy, fulguration polypectomy. SURGEON:  Iain Cross    ASSISTANT:  None. ANESTHESIA:  IV sedation with propofol per Anesthesia. ESTIMATED BLOOD LOSS:  None. SPECIMENS REMOVED:  Three colonic polyps as noted above appropriately labeled. COMPLICATIONS:  None. IMPLANTS:  None. INDICATIONS:  Patient is a 68-year-old white male with a history of colonic polyps. His last exam was 5 years ago. He is in today for routine followup exam.  He is aware of the risks of the procedure including bleeding, perforation and the risk of missing small polyps. He is agreeable to proceed. PROCEDURE IN DETAIL:  After uneventful induction of IV sedation, the patient was placed in the left lateral position and the pediatric 180 stiffening scope passed through the colon to the cecal cap without difficulty. Prep was excellent. The ileocecal valve was photographed simply to document location and anatomy. Cecum, ileocecal valve and ascending colon were all normal as the scope was slowly and carefully withdrawn. In the transverse colon, there was a single 5-6 mm polyp at the left transverse, which was removed with biopsy and fulguration in its entirety. Hemostasis was complete. Scope was retracted back to the descending colon and in the proximal descending colon, another 5-6 mm sessile polyp was seen. This was also removed with biopsy fulguration in its entirety and sent appropriately labeled for permanent sections. Finally, in the mid descending colon, there was a third polyp which was removed with biopsy and fulguration in its entirety as well. Scope was removed through the remainder of the distal descending colon and the sigmoid all of which were clear. There was no evidence of any polyps or diverticula. Indeed, there were no diverticula noted throughout the entire exam.  Scope was returned to the rectum, which was normal.  Anal canal was unremarkable. Air was aspirated. The scope was removed and the exam terminated. The patient tolerated the exam well, remained stable. Left benign abdomen. In view of the fact that he had 3 polyps this time he ought to undergo followup exam in 3 years.       MD SHAHANA Winn / Demetrio Fitzgerald  D: 12/05/2018 09:06     T: 12/05/2018 12:59  JOB #: [id number]  CC: Adair Hutchins MD  CC: Tamela Heimlich MD

## 2018-12-05 NOTE — INTERVAL H&P NOTE
H&P Update: 
Ada Mohs was seen and examined. History and physical has been reviewed. Significant clinical changes have occurred as noted:  ENT clear;  Cor regular without failure;  Lungs clear to A and P;  Abdomen obese but benign;  Rectal pending c-scope; Extremities and neuro WNL.  
 
Signed By: Idalmis Lane MD   
 December 5, 2018 8:18 AM

## 2018-12-05 NOTE — H&P
[address] HISTORY AND PHYSICAL Name:Denny DUMAS 
MR#: [id number] : 1950 ACCOUNT #: [de-identified] ADMIT DATE: 2018 DATE OF PROCEDURE:  2018. CHIEF COMPLAINT:  Followup colonoscopy with history of polyps. HISTORY OF PRESENT ILLNESS:  Patient is a 68-year-old gentleman in for a 5-year interval screening colonoscopy with a history of polyps. He is in today for followup 5-year routine screening exam.  He is currently asymptomatic with regard to his GI tract. PAST MEDICAL HISTORY:  Significant for hypertension and hyperlipidemia. MEDICATIONS:  Include atorvastatin, amlodipine, sumatriptan and Dulera. ALLERGIES:  None. 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 
 
SOCIAL HISTORY:  He is , drinks socially, does not smoke. FAMILY HISTORY:  Noncontributory. REVIEW OF SYSTEMS:  Negative for any GI complaints. PHYSICAL EXAMINATION: Pending. IMPRESSION:  A 68-year-old gentleman with a history of previous colonic polyps. He is in today for a 5-year followup exam. 
 
PLAN:  He is aware of the risks of the procedure including bleeding, perforation, the risk of missing small polyps. He is agreeable to proceed. We will move forward with exam today. MD PAMELA PrestonT/ 
D: 2018 22:20    
T: 2018 05:27 JOB #: [id number] CC: Adelina Benitez MD 
CC: Cee Arita MD

## 2018-12-05 NOTE — PERIOP NOTES
Permission received to review discharge instructions and discuss private health information with Thalia Trent (wife).

## 2018-12-05 NOTE — PERIOP NOTES
Aminah Clay 1950 
[id number] Situation: 
Verbal report given from: MAGADLENO Smith Procedure: Procedure(s): 
COLONOSCOPY 
COLON BIOPSY Background: 
 
Preoperative diagnosis: HX OF POLYPS Postoperative diagnosis: COLON POLYPS :  Dr. Bela Reyes Assistant(s): Circ-1: Skinny Michel RN Scrub Tech-1: Makayla Pillow Specimens:  
ID Type Source Tests Collected by Time Destination 1 : LEFT TRANSVERSE COLON POLYP BIOPSY  Preservative Colon, Transverse  Kourtney Barraza MD 12/5/2018 2277 Pathology 2 : PROXIMAL DESCENDING COLON POLYP BIOPSY  Preservative Colon, Descending  Kourtney Barraza MD 12/5/2018 5743 Pathology 3 : MID DESCENDING COLON POLYP BIOPSY  Preservative Colon, Descending  Kourtney Barraza MD 12/5/2018 6193 Pathology Assessment: 
Intra-procedure medications Anesthesia gave intra-procedure sedation and medications, see anesthesia flow sheet Intravenous fluids: Rangel Osmel Vital signs stable Recommendation: 
 
Permission to share finding with wife Susan : yes

## 2019-01-15 DIAGNOSIS — G43.719 INTRACTABLE CHRONIC MIGRAINE WITHOUT AURA AND WITHOUT STATUS MIGRAINOSUS: ICD-10-CM

## 2019-01-15 RX ORDER — SUMATRIPTAN 100 MG/1
TABLET, FILM COATED ORAL
Qty: 9 TAB | Refills: 2 | Status: SHIPPED | OUTPATIENT
Start: 2019-01-15 | End: 2019-04-14 | Stop reason: SDUPTHER

## 2019-01-15 NOTE — TELEPHONE ENCOUNTER
RX refill request from the patient/pharmacy.  Patient last seen 11- with labs, and next appt. scheduled for 05-  Requested Prescriptions     Pending Prescriptions Disp Refills    SUMAtriptan (IMITREX) 100 mg tablet 9 Tab 2     Sig: TAKE ONE TABLET BY MOUTH DAILY AS DIRECTED PER MD   .

## 2019-04-14 DIAGNOSIS — G43.719 INTRACTABLE CHRONIC MIGRAINE WITHOUT AURA AND WITHOUT STATUS MIGRAINOSUS: ICD-10-CM

## 2019-04-14 DIAGNOSIS — E78.2 MIXED HYPERLIPIDEMIA: ICD-10-CM

## 2019-04-15 RX ORDER — ATORVASTATIN CALCIUM 40 MG/1
TABLET, FILM COATED ORAL
Qty: 90 TAB | Refills: 3 | Status: SHIPPED | OUTPATIENT
Start: 2019-04-15

## 2019-04-15 RX ORDER — SUMATRIPTAN 100 MG/1
TABLET, FILM COATED ORAL
Qty: 9 TAB | Refills: 2 | Status: SHIPPED | OUTPATIENT
Start: 2019-04-15 | End: 2019-07-27 | Stop reason: SDUPTHER

## 2019-04-15 NOTE — TELEPHONE ENCOUNTER
RX refill request from the patient/pharmacy.  Patient last seen 11- with labs, and next appt. scheduled for 05-  Requested Prescriptions     Pending Prescriptions Disp Refills    atorvastatin (LIPITOR) 40 mg tablet [Pharmacy Med Name: ATORVASTATIN 40 MG TAB[*]] 90 Tab 3     Sig: TAKE ONE TABLET BY MOUTH ONE TIME DAILY    SUMAtriptan (IMITREX) 100 mg tablet [Pharmacy Med Name: SUMATRIPTAN 100 MG TAB U/D] 9 Tab 2     Sig: TAKE ONE TABLET BY MOUTH ONE TIME DAILY AS DIRECTED PER MD   .

## 2019-05-15 NOTE — PROGRESS NOTES
Chief Complaint Patient presents with  Hypertension 6 month follow up SUBJECTIVE: 
 
Eugenio Gracia is a 77 y.o. male who returns in follow-up for his medical problems include hypertension, glucose intolerance, hyperlipidemia, asthma, obesity, DJD and other medical problems. He is taking his medications and trying to follow his diet and trying to remain physically active. He is now retired and is having a hectic time getting his house ready and sold which closed and scheduled for June 19 and then he will be moving to [place] for [employer]. He currently denies any chest pain, shortness of breath, palpitations, PND, orthopnea or other cardiorespiratory complaints. He denies any GI or  complaints. He denies any headaches, dizziness or neurologic complaints. He has no current arthritic complaints and there are no other complaints on complete review of systems. Current Outpatient Medications Medication Sig Dispense Refill  budesonide-formoterol (SYMBICORT) 160-4.5 mcg/actuation HFAA Take 2 Puffs by inhalation two (2) times a day. 3 Inhaler prn  atorvastatin (LIPITOR) 40 mg tablet TAKE ONE TABLET BY MOUTH ONE TIME DAILY 90 Tab 3  
 SUMAtriptan (IMITREX) 100 mg tablet TAKE ONE TABLET BY MOUTH ONE TIME DAILY AS DIRECTED PER MD 9 Tab 2  
 amLODIPine (NORVASC) 5 mg tablet TAKE ONE TABLET BY MOUTH DAILY (Patient taking differently: TAKE ONE TABLET BY MOUTH AT BEDTIME) 30 Tab 11 Past Medical History:  
Diagnosis Date  Annual physical exam 9/13/2017  Asthma 9/13/2017  Cellulitis 9/13/2017  COPD \"minor case of COPD\" per pt, uses Advair, followed by PCP  DJD (degenerative joint disease) 9/13/2017  Duodenal ulcer 9/13/2017  Glucose intolerance (impaired glucose tolerance) 9/13/2017  Hypercholesteremia  Hyperlipidemia 9/13/2017  Hypertension  Migraine 9/13/2017  Obesity 9/13/2017  On statin therapy 9/13/2017 Past Surgical History: Procedure Laterality Date  COLONOSCOPY N/A 12/5/2018 COLONOSCOPY performed by Trent Goodwin MD at [facility] AMBULATORY OR  
 HX COLONOSCOPY    
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  
 HX TONSILLECTOMY No Known Allergies REVIEW OF SYSTEMS: 
General: negative for - chills or fever, or weight loss or gain ENT: negative for - headaches, nasal congestion or tinnitus Eyes: no blurred or visual changes Neck: No stiffness or swollen nodes Respiratory: negative for - cough, hemoptysis, shortness of breath or wheezing Cardiovascular : negative for - chest pain, edema, palpitations or shortness of breath Gastrointestinal: negative for - abdominal pain, blood in stools, heartburn or nausea/vomiting Genito-Urinary: no dysuria, trouble voiding, or hematuria Musculoskeletal: negative for - gait disturbance, joint pain, joint stiffness or joint swelling Neurological: no TIA or stroke symptoms Hematologic: no bruises, no bleeding Lymphatic: no swollen glands Integument: no lumps, mole changes, nail changes or rash Endocrine:no malaise/lethargy poly uria or polydipsia or unexpected weight changes Social History Socioeconomic History  Marital status:  Spouse name: Not on file  Number of children: Not on file  Years of education: Not on file  Highest education level: Not on file Tobacco Use  Smoking status: Never Smoker  Smokeless tobacco: Never Used Substance and Sexual Activity  Alcohol use: Yes Comment: \"very little\" Per pt, not weekly  Drug use: No  
 
History reviewed. No pertinent family history. OBJECTIVE:  
 
Visit Vitals /86 Pulse 63 Temp 98.8 °F (37.1 °C) (Oral) Resp 16 Ht 6' 2\" (1.88 m) Wt 282 lb (127.9 kg) SpO2 96% BMI 36.21 kg/m² CONSTITUTIONAL:   well nourished, appears age appropriate EYES: sclera anicteric, PERRL, EOMI 
ENMT:nares clear, moist mucous membranes, pharynx clear NECK: supple. Thyroid normal, No JVD or bruits RESPIRATORY: Chest: clear to ascultation and percussion, normal inspiratory effort CARDIOVASCULAR: Heart: regular rate and rhythm no murmurs, rubs or gallops, PMI not displaced, No thrills GASTROINTESTINAL: Abdomen: non distended, soft, non tender, bowel sounds normal 
HEMATOLOGIC: no purpura, petechiae or bruising LYMPHATIC: No lymph node enlargemant MUSCULOSKELETAL: Extremities: no edema or active synovitis, pulse 1+ INTEGUMENT: No unusual rashes or suspicious skin lesions noted. Nails appear normal. 
PERIPHERAL VASCULAR: normal pulses femoral, PT and DP NEUROLOGIC: non-focal exam, A & O X 3 PSYCHIATRIC:, appropriate affect ASSESSMENT:  
1. Essential hypertension 2. Glucose intolerance (impaired glucose tolerance) 3. Mixed hyperlipidemia 4. Mild intermittent asthma without complication 5. Primary osteoarthritis involving multiple joints 6. Severe obesity (BMI 35.0-39. 9) with comorbidity ([address] Impression 1. Hypertension initially up by the nurse but repeat by me was good so continue current therapy reviewed with him. 2.  Glucose intolerance repeat status pending a prior lab reviewed no make adjustments if necessary. 3   Hyperlipidemia prior lab reviewed and repeat status pending I will adjust if needed. 4.  Asthma that is stable I renewed his inhaler insurance may be switching from [facility] to [employer] 5. DJD that is stable 6. Obesity we did discuss diet, exercise and weight reduction for overall health benefit. Tentatively we have set up follow-up for 6 months although at that point he will probably be in [place] and have some change physicians. I will call with lab results and make adjustments if necessary. PLAN: 
. Orders Placed This Encounter  HEMOGLOBIN A1C WITH EAG  
 METABOLIC PANEL, COMPREHENSIVE ([facility])  LIPID PANEL ([facility])  CK ([facility])  budesonide-formoterol (SYMBICORT) 160-4.5 mcg/actuation HFAA  
 
 
 
ATTENTION:  
This medical record was transcribed using an electronic medical records system. Although proofread, it may and can contain electronic and spelling errors. Other human spelling and other errors may be present. Corrections may be executed at a later time. Please feel free to contact us for any clarifications as needed. Follow-up and Dispositions · Return in about 6 months (around 11/16/2019). No results found for any visits on 05/16/19. Munira Jones MD 
 
The patient verbalized understanding of the problems and plans as explained.

## 2019-05-16 ENCOUNTER — OFFICE VISIT (OUTPATIENT)
Dept: INTERNAL MEDICINE CLINIC | Age: 69
End: 2019-05-16

## 2019-05-16 VITALS
RESPIRATION RATE: 16 BRPM | OXYGEN SATURATION: 96 % | TEMPERATURE: 98.8 F | HEIGHT: 74 IN | DIASTOLIC BLOOD PRESSURE: 86 MMHG | BODY MASS INDEX: 36.19 KG/M2 | HEART RATE: 63 BPM | SYSTOLIC BLOOD PRESSURE: 134 MMHG | WEIGHT: 282 LBS

## 2019-05-16 DIAGNOSIS — M15.9 PRIMARY OSTEOARTHRITIS INVOLVING MULTIPLE JOINTS: ICD-10-CM

## 2019-05-16 DIAGNOSIS — E66.01 SEVERE OBESITY (BMI 35.0-39.9) WITH COMORBIDITY (HCC): ICD-10-CM

## 2019-05-16 DIAGNOSIS — I10 ESSENTIAL HYPERTENSION: Primary | ICD-10-CM

## 2019-05-16 DIAGNOSIS — R73.02 GLUCOSE INTOLERANCE (IMPAIRED GLUCOSE TOLERANCE): ICD-10-CM

## 2019-05-16 DIAGNOSIS — J45.20 MILD INTERMITTENT ASTHMA WITHOUT COMPLICATION: ICD-10-CM

## 2019-05-16 DIAGNOSIS — E78.2 MIXED HYPERLIPIDEMIA: ICD-10-CM

## 2019-05-16 LAB
A-G RATIO,AGRAT: 1.5 RATIO
ALBUMIN SERPL-MCNC: 4.3 G/DL (ref 3.9–5.4)
ALP SERPL-CCNC: 131 U/L (ref 38–126)
ALT SERPL-CCNC: 32 U/L (ref 9–52)
ANION GAP SERPL CALC-SCNC: 13 MMOL/L
AST SERPL W P-5'-P-CCNC: 30 U/L (ref 14–36)
BILIRUB SERPL-MCNC: 0.7 MG/DL (ref 0.2–1.3)
BUN SERPL-MCNC: 16 MG/DL (ref 9–20)
BUN/CREATININE RATIO,BUCR: 16 RATIO
CALCIUM SERPL-MCNC: 10.5 MG/DL (ref 8.4–10.2)
CHLORIDE SERPL-SCNC: 107 MMOL/L (ref 98–107)
CHOL/HDL RATIO,CHHD: 3 RATIO (ref 0–4)
CHOLEST SERPL-MCNC: 167 MG/DL (ref 0–200)
CK SERPL-CCNC: 167 U/L (ref 30–135)
CO2 SERPL-SCNC: 24 MMOL/L (ref 22–32)
CREAT SERPL-MCNC: 1 MG/DL (ref 0.8–1.5)
GLOBULIN,GLOB: 2.8
GLUCOSE SERPL-MCNC: 105 MG/DL (ref 75–110)
HDLC SERPL-MCNC: 50 MG/DL (ref 35–130)
LDL/HDL RATIO,LDHD: 2 RATIO
LDLC SERPL CALC-MCNC: 95 MG/DL (ref 0–130)
POTASSIUM SERPL-SCNC: 5.3 MMOL/L (ref 3.6–5)
PROT SERPL-MCNC: 7.1 G/DL (ref 6.3–8.2)
SODIUM SERPL-SCNC: 144 MMOL/L (ref 137–145)
TRIGL SERPL-MCNC: 109 MG/DL (ref 0–200)
VLDLC SERPL CALC-MCNC: 22 MG/DL

## 2019-05-16 RX ORDER — BUDESONIDE AND FORMOTEROL FUMARATE DIHYDRATE 160; 4.5 UG/1; UG/1
2 AEROSOL RESPIRATORY (INHALATION) 2 TIMES DAILY
Qty: 3 INHALER | Status: SHIPPED | OUTPATIENT
Start: 2019-05-16

## 2019-05-16 NOTE — PROGRESS NOTES
Yoselyn Almeida  Identified pt with two pt identifiers(name and ). Chief Complaint Patient presents with  Hypertension 6 month follow up 1. Have you been to the ER, urgent care clinic since your last visit? Hospitalized since your last visit? No 
 
2. Have you seen or consulted any other health care providers outside of the [address] [place] [address] since your last visit? Include any pap smears or colon screening. No 
 
Health Maintenance Topics with due status: Overdue Topic Date Due DTaP/Tdap/Td series 1969 Shingrix Vaccine Age 50> 1998 GLAUCOMA SCREENING Q2Y 2013 Health Maintenance Topics with due status: Not Due Topic Last Completion Date Influenza Age 5 to Adult 10/15/2018 MEDICARE YEARLY EXAM 2018 COLONOSCOPY 2018 Health Maintenance Topics with due status: Completed Topic Last Completion Date Hepatitis C Screening 2017 Pneumococcal 65+ years 2017 Medication reconciliation up to date and corrected with patient at this time. Today's provider has been notified of reason for visit, vitals and flowsheets obtained on patients. Reviewed record in preparation for visit, huddled with provider and have obtained necessary documentation. Wt Readings from Last 3 Encounters:  
19 282 lb (127.9 kg) 18 279 lb (126.6 kg) 18 282 lb 9.6 oz (128.2 kg) Temp Readings from Last 3 Encounters:  
19 98.8 °F (37.1 °C) (Oral) 18 97.9 °F (36.6 °C)  
18 97.9 °F (36.6 °C) (Oral) BP Readings from Last 3 Encounters:  
19 (!) 140/100  
18 120/66  
18 122/80 Pulse Readings from Last 3 Encounters:  
19 63  
18 (!) 58  
18 68 Vitals:  
 19 1053 BP: (!) 140/100 Pulse: 63 Resp: 16 Temp: 98.8 °F (37.1 °C) TempSrc: Oral  
SpO2: 96% Weight: 282 lb (127.9 kg) Height: 6' 2\" (1.88 m) PainSc:   0 - No pain Learning Assessment: 
:  
 
Learning Assessment 5/8/2018 PRIMARY LEARNER Patient HIGHEST LEVEL OF EDUCATION - PRIMARY LEARNER  > 4 YEARS OF COLLEGE  
BARRIERS PRIMARY LEARNER NONE  
CO-LEARNER CAREGIVER No  
PRIMARY LANGUAGE ENGLISH  
LEARNER PREFERENCE PRIMARY LISTENING  
ANSWERED BY self RELATIONSHIP SELF Depression Screening: 
:  
 
3 most recent PHQ Screens 5/16/2019 Little interest or pleasure in doing things Not at all Feeling down, depressed, irritable, or hopeless Not at all Total Score PHQ 2 0 No flowsheet data found. Fall Risk Assessment: 
:  
 
Fall Risk Assessment, last 12 mths 11/8/2018 Able to walk? Yes Fall in past 12 months? No  
 
 
Abuse Screening: 
:  
 
Abuse Screening Questionnaire 5/16/2019 5/8/2018 Do you ever feel afraid of your partner? Guanaco Muck Are you in a relationship with someone who physically or mentally threatens you? [place]anaco Muck Is it safe for you to go home? Y Y  
 
 
ADL Screening: 
:  
 

## 2019-05-16 NOTE — PATIENT INSTRUCTIONS
Arthritis: Care Instructions Your Care Instructions Arthritis, also called osteoarthritis, is a breakdown of the cartilage that cushions your joints. When the cartilage wears down, your bones rub against each other. This causes pain and stiffness. Many people have some arthritis as they age. Arthritis most often affects the joints of the spine, hands, hips, knees, or feet. You can take simple measures to protect your joints, ease your pain, and help you stay active. Follow-up care is a key part of your treatment and safety. Be sure to make and go to all appointments, and call your doctor if you are having problems. It's also a good idea to know your test results and keep a list of the medicines you take. How can you care for yourself at home? · Stay at a healthy weight. Being overweight puts extra strain on your joints. · Talk to your doctor or physical therapist about exercises that will help ease joint pain. ? Stretch. You may enjoy gentle forms of yoga to help keep your joints and muscles flexible. ? Walk instead of jog. Other types of exercise that are less stressful on the joints include riding a bicycle, swimming, justus chi, or water exercise. ? Lift weights. Strong muscles help reduce stress on your joints. Stronger thigh muscles, for example, take some of the stress off of the knees and hips. Learn the right way to lift weights so you do not make joint pain worse. · Take your medicines exactly as prescribed. Call your doctor if you think you are having a problem with your medicine. · Take pain medicines exactly as directed. ? If the doctor gave you a prescription medicine for pain, take it as prescribed. ? If you are not taking a prescription pain medicine, ask your doctor if you can take an over-the-counter medicine. · Use a cane, crutch, walker, or another device if you need help to get around. These can help rest your joints.  You also can use other things to make life easier, such as a higher toilet seat and padded handles on kitchen utensils. · Do not sit in low chairs, which can make it hard to get up. · Put heat or cold on your sore joints as needed. Use whichever helps you most. You also can take turns with hot and cold packs. ? Apply heat 2 or 3 times a day for 20 to 30 minutesusing a heating pad, hot shower, or hot packto relieve pain and stiffness. ? Put ice or a cold pack on your sore joint for 10 to 20 minutes at a time. Put a thin cloth between the ice and your skin. When should you call for help? Call your doctor now or seek immediate medical care if: 
  · You have sudden swelling, warmth, or pain in any joint.  
  · You have joint pain and a fever or rash.  
  · You have such bad pain that you cannot use a joint.  
 Watch closely for changes in your health, and be sure to contact your doctor if: 
  · You have mild joint symptoms that continue even with more than 6 weeks of care at home.  
  · You have stomach pain or other problems with your medicine. Where can you learn more? Go to http://zev-darnell.info/. Enter O001 in the search box to learn more about \"Arthritis: Care Instructions. \" Current as of: Kimberlyn 10, 2018 Content Version: 11.9 © 0144-5324 [employer]. Care instructions adapted under license by [employer] (which disclaims liability or warranty for this information). If you have questions about a medical condition or this instruction, always ask your healthcare professional. [place] [address] any warranty or liability for your use of this information.

## 2019-05-17 LAB
EST. AVERAGE GLUCOSE BLD GHB EST-MCNC: 126 MG/DL
HBA1C MFR BLD: 6 % (ref 4.8–5.6)

## 2019-07-27 DIAGNOSIS — G43.719 INTRACTABLE CHRONIC MIGRAINE WITHOUT AURA AND WITHOUT STATUS MIGRAINOSUS: ICD-10-CM

## 2019-07-29 RX ORDER — SUMATRIPTAN 100 MG/1
TABLET, FILM COATED ORAL
Qty: 9 TAB | Refills: 2 | Status: SHIPPED | OUTPATIENT
Start: 2019-07-29 | End: 2019-10-30 | Stop reason: SDUPTHER

## 2019-07-29 NOTE — TELEPHONE ENCOUNTER
RX refill request from the patient/pharmacy. Patient last seen 05- with labs, and next appt. scheduled for 11-  Requested Prescriptions     Pending Prescriptions Disp Refills    SUMAtriptan (IMITREX) 100 mg tablet [Pharmacy Med Name: SUMATRIPTAN 100 MG TAB U/D] 9 Tab 2     Sig: TAKE ONE TABLET BY MOUTH ONE TIME DAILY AS DIRECTED PER MD. MAX DAILY AMOUNT IS 2 TABLETS   .

## 2019-09-23 PROBLEM — Z12.5 PROSTATE CANCER SCREENING: Status: RESOLVED | Noted: 2018-11-07 | Resolved: 2019-09-23

## 2019-09-23 PROBLEM — Z12.11 ENCOUNTER FOR COLONOSCOPY DUE TO HISTORY OF ADENOMATOUS COLONIC POLYPS: Status: RESOLVED | Noted: 2018-12-05 | Resolved: 2019-09-23

## 2019-09-23 PROBLEM — Z00.00 MEDICARE ANNUAL WELLNESS VISIT, SUBSEQUENT: Status: RESOLVED | Noted: 2017-11-08 | Resolved: 2019-09-23

## 2019-09-23 PROBLEM — Z86.010 ENCOUNTER FOR COLONOSCOPY DUE TO HISTORY OF ADENOMATOUS COLONIC POLYPS: Status: RESOLVED | Noted: 2018-12-05 | Resolved: 2019-09-23

## 2019-10-16 DIAGNOSIS — I10 ESSENTIAL HYPERTENSION: ICD-10-CM

## 2019-10-16 RX ORDER — AMLODIPINE BESYLATE 5 MG/1
TABLET ORAL
Qty: 90 TAB | Refills: 3 | Status: SHIPPED | OUTPATIENT
Start: 2019-10-16

## 2019-10-16 NOTE — TELEPHONE ENCOUNTER
RX refill request from the patient/pharmacy. Patient last seen 05- with labs, and next appt. scheduled for 11-  Requested Prescriptions     Pending Prescriptions Disp Refills    amLODIPine (NORVASC) 5 mg tablet 90 Tab 3     Sig: TAKE ONE TABLET BY MOUTH AT BEDTIME   .

## 2019-10-30 DIAGNOSIS — G43.719 INTRACTABLE CHRONIC MIGRAINE WITHOUT AURA AND WITHOUT STATUS MIGRAINOSUS: ICD-10-CM

## 2019-10-30 RX ORDER — SUMATRIPTAN 100 MG/1
TABLET, FILM COATED ORAL
Qty: 9 TAB | Refills: 2 | Status: SHIPPED | OUTPATIENT
Start: 2019-10-30

## 2019-11-07 PROBLEM — Z13.39 ALCOHOL SCREENING: Status: ACTIVE | Noted: 2017-09-13

## 2019-11-08 ENCOUNTER — OFFICE VISIT (OUTPATIENT)
Dept: INTERNAL MEDICINE CLINIC | Age: 69
End: 2019-11-08

## 2019-11-08 DIAGNOSIS — I10 ESSENTIAL HYPERTENSION: Primary | ICD-10-CM

## 2019-11-08 DIAGNOSIS — E78.2 MIXED HYPERLIPIDEMIA: ICD-10-CM

## 2019-11-08 DIAGNOSIS — M15.9 PRIMARY OSTEOARTHRITIS INVOLVING MULTIPLE JOINTS: ICD-10-CM

## 2019-11-08 DIAGNOSIS — Z13.39 ALCOHOL SCREENING: ICD-10-CM

## 2019-11-08 DIAGNOSIS — J45.20 MILD INTERMITTENT ASTHMA WITHOUT COMPLICATION: ICD-10-CM

## 2019-11-08 DIAGNOSIS — Z00.00 MEDICARE ANNUAL WELLNESS VISIT, SUBSEQUENT: ICD-10-CM

## 2019-11-08 DIAGNOSIS — R73.02 GLUCOSE INTOLERANCE (IMPAIRED GLUCOSE TOLERANCE): ICD-10-CM

## 2019-11-08 DIAGNOSIS — Z12.5 PROSTATE CANCER SCREENING: ICD-10-CM

## 2019-11-08 DIAGNOSIS — E66.01 SEVERE OBESITY (BMI 35.0-39.9) WITH COMORBIDITY (HCC): ICD-10-CM

## 2019-12-07 PROBLEM — Z12.5 PROSTATE CANCER SCREENING: Status: RESOLVED | Noted: 2018-11-07 | Resolved: 2019-12-07

## 2019-12-07 PROBLEM — Z00.00 MEDICARE ANNUAL WELLNESS VISIT, SUBSEQUENT: Status: RESOLVED | Noted: 2017-11-08 | Resolved: 2019-12-07

## (undated) DEVICE — SNARE ENDOSCP M L240CM W27MM SHTH DIA2.4MM CHN 2.8MM OVL

## (undated) DEVICE — SOLIDIFIER MEDC 1200ML -- CONVERT TO 356117

## (undated) DEVICE — 1200 GUARD II KIT W/5MM TUBE W/O VAC TUBE: Brand: GUARDIAN

## (undated) DEVICE — KENDALL DL ECG CABLE AND LEAD WIRE SYSTEM, 3-LEAD, SINGLE PATIENT USE: Brand: KENDALL

## (undated) DEVICE — ENDO CARRY-ON PROCEDURE KIT INCLUDES ENZYMATIC SPONGE, GAUZE, BIOHAZARD LABEL, TRAY, LUBRICANT, DIRTY SCOPE LABEL, WATER LABEL, TRAY, DRAWSTRING PAD, AND DEFENDO 4-PIECE KIT.: Brand: ENDO CARRY-ON PROCEDURE KIT

## (undated) DEVICE — BAG SPEC BIOHZRD 10 X 10 IN --

## (undated) DEVICE — CONTINU-FLO SOLUTION SET, 2 INJECTION SITES, MALE LUER LOCK ADAPTER WITH RETRACTABLE COLLAR, LARGE BORE STOPCOCK WITH ROTATING MALE LUER LOCK EXTENSION SET, 2 INJECTION SITES, MALE LUER LOCK ADAPTER WITH RETRACTABLE COLLAR: Brand: INTERLINK/CONTINU-FLO

## (undated) DEVICE — REM POLYHESIVE ADULT PATIENT RETURN ELECTRODE: Brand: VALLEYLAB

## (undated) DEVICE — SOLUTION LACTATED RINGERS INJECTION USP

## (undated) DEVICE — 3M™ TEGADERM™ TRANSPARENT FILM DRESSING FRAME STYLE, 1624W, 2-3/8 IN X 2-3/4 IN (6 CM X 7 CM), 100/CT 4CT/CASE: Brand: 3M™ TEGADERM™

## (undated) DEVICE — FCPS BX HOT RJ4 2.2MMX240CM -- RADIAL JAW 4 BX/40